# Patient Record
Sex: FEMALE | Race: WHITE | NOT HISPANIC OR LATINO | Employment: FULL TIME | URBAN - NONMETROPOLITAN AREA
[De-identification: names, ages, dates, MRNs, and addresses within clinical notes are randomized per-mention and may not be internally consistent; named-entity substitution may affect disease eponyms.]

---

## 2017-01-31 RX ORDER — RANITIDINE 150 MG/1
150 TABLET ORAL DAILY
COMMUNITY
End: 2017-04-14

## 2017-02-02 ENCOUNTER — OFFICE VISIT (OUTPATIENT)
Dept: GASTROENTEROLOGY | Facility: CLINIC | Age: 22
End: 2017-02-02

## 2017-02-02 VITALS
HEART RATE: 64 BPM | WEIGHT: 132 LBS | OXYGEN SATURATION: 99 % | HEIGHT: 63 IN | SYSTOLIC BLOOD PRESSURE: 130 MMHG | DIASTOLIC BLOOD PRESSURE: 70 MMHG | BODY MASS INDEX: 23.39 KG/M2

## 2017-02-02 DIAGNOSIS — R10.13 EPIGASTRIC PAIN: Primary | ICD-10-CM

## 2017-02-02 DIAGNOSIS — R11.0 NAUSEA: ICD-10-CM

## 2017-02-02 PROCEDURE — 99203 OFFICE O/P NEW LOW 30 MIN: CPT | Performed by: CLINICAL NURSE SPECIALIST

## 2017-02-02 NOTE — PROGRESS NOTES
Chief Complaint   Patient presents with   • GI Problem     New patient ref by Dr. NUVIA Quintana for nausea and vomiting     Subjective   HPI  Nelia Chavez is a 21 y.o. female who presents with a complaint of nausea or vomiting. She says that this began in November. She has epigastric abdominal pain that is a burn and cramp, mild to moderate comes and goes. No triggers no alleviating factors.  She had her gallbladder out in December and this did help somewhat. She was vomiting daily for a month prior to that. She continues to have persistent nausea less vomiting. No aggravating or alleviating factors.  No change in bowels. No BRBPR. No melena. She is more on the constipated side of things even after her gallbladder being removed.   She says that she cannot drink water bc it makes her sugar low. She drinks coke products and monster drinks, her mom says low sugar or coke zero. She is diabetic and is on insulin pump.  She will run anywhere from in the 300s to hypoglycemia in the 40s which she is today, She is working nights now and this has been difficult to manage her sugar. She has an appointment at [facility] next week for her diabetes. She says that her thyroid is being monitored there as well. She is taking Zantac 150 mg once per day.     She does drink ETOH weekly bloody Jena, margaritas, mixed drinks at least weekly not just on the weekends    Past Medical History   Diagnosis Date   • Diabetes mellitus      type 1. Pt is on insulin pump   • Dysmenorrhea    • Thyromegaly      Past Surgical History   Procedure Laterality Date   • No past surgeries       Outpatient Prescriptions Marked as Taking for the 2/2/17 encounter (Office Visit) with EDUIN Arana   Medication Sig Dispense Refill   • insulin aspart (novoLOG) 100 UNIT/ML injection Inject  under the skin 3 (Three) Times a Day Before Meals.     • Norethin-Eth Estrad-Fe Biphas (LO LOESTRIN FE) 1 MG-10 MCG / 10 MCG tablet Take  by mouth.     • raNITIdine  (ZANTAC) 150 MG tablet Take 150 mg by mouth Daily.       No Known Allergies  Social History     Social History   • Marital status: Single     Spouse name: N/A   • Number of children: N/A   • Years of education: N/A     Occupational History   • Not on file.     Social History Main Topics   • Smoking status: Never Smoker   • Smokeless tobacco: Never Used   • Alcohol use Yes      Comment: Socially   • Drug use: No   • Sexual activity: Defer     Other Topics Concern   • Not on file     Social History Narrative     Family History   Problem Relation Age of Onset   • Diabetes Maternal Grandmother    • Raynaud syndrome Maternal Grandmother    • Coronary artery disease Maternal Grandmother    • Diabetes Maternal Grandfather    • Coronary artery disease Maternal Grandfather    • Diabetes Paternal Grandmother    • Coronary artery disease Paternal Grandmother    • Coronary artery disease Paternal Grandfather    • Colon cancer Neg Hx    • Colon polyps Neg Hx      Health Maintenance   Topic Date Due   • TDAP/TD VACCINES (1 - Tdap) 04/07/2014   • INFLUENZA VACCINE  08/01/2016     Review of Systems   Constitutional: Negative for activity change, appetite change, chills, diaphoresis, fatigue, fever and unexpected weight change.   HENT: Negative for ear pain, hearing loss, mouth sores, sore throat, trouble swallowing and voice change.    Eyes: Negative.    Respiratory: Negative for cough, choking, shortness of breath and wheezing.    Cardiovascular: Negative for chest pain and palpitations.   Gastrointestinal: Positive for abdominal pain and constipation. Negative for blood in stool, diarrhea, nausea and vomiting.   Endocrine: Negative for cold intolerance and heat intolerance.   Genitourinary: Negative for decreased urine volume, dysuria, frequency, hematuria and urgency.   Musculoskeletal: Negative for back pain, gait problem and myalgias.   Skin: Negative for color change, pallor and rash.   Allergic/Immunologic: Negative for food  "allergies and immunocompromised state.   Neurological: Negative for dizziness, tremors, seizures, syncope, weakness, light-headedness, numbness and headaches.   Hematological: Negative for adenopathy. Does not bruise/bleed easily.   Psychiatric/Behavioral: Negative for agitation and confusion. The patient is not nervous/anxious.    All other systems reviewed and are negative.    Objective   Vitals:    02/02/17 1029   BP: 130/70   Pulse: 64   SpO2: 99%   Weight: 132 lb (59.9 kg)   Height: 63\" (160 cm)     Body mass index is 23.38 kg/(m^2).  Physical Exam   Constitutional: She is oriented to person, place, and time. She appears well-developed and well-nourished.   HENT:   Head: Normocephalic and atraumatic.   Eyes: Pupils are equal, round, and reactive to light.   Neck: Normal range of motion. Neck supple. No tracheal deviation present.   Cardiovascular: Normal rate, regular rhythm and normal heart sounds.  Exam reveals no gallop and no friction rub.    No murmur heard.  Pulmonary/Chest: Effort normal and breath sounds normal. No respiratory distress. She has no wheezes. She has no rales. She exhibits no tenderness.   Abdominal: Soft. Bowel sounds are normal. She exhibits no distension. There is no hepatosplenomegaly. There is no tenderness. There is no rigidity, no rebound and no guarding.   Musculoskeletal: Normal range of motion. She exhibits no edema, tenderness or deformity.   Neurological: She is alert and oriented to person, place, and time. She has normal reflexes.   Skin: Skin is warm and dry. No rash noted. No pallor.   Psychiatric: She has a normal mood and affect. Her behavior is normal. Judgment and thought content normal.     Assessment/Plan   Kenton was seen today for gi problem.    Diagnoses and all orders for this visit:    Epigastric pain    Nausea    LONG DISCUSSION TODAY WITH KENTON AND HER MOM WHOM IS HERE WITH HER TODAY. I THINK A CONTRIBUTING FACTOR FOR HER NAUSEA AND EPIGASTRIC PAIN IS HER " DIABETES AND INSTABILITY OF HER GLUCOSE. I ALSO DISCUSSED DIET WITH HER AND SUGGESTED THAT SHE DISCONTINUE HER CARBONATION AND MONSTER DRINKS FOR THESE ARE HARD ON HER STOMACH. WE DISCUSSED ETOH AS WELL AND I ADVISED HER TO NOT DRINK NOT ONLY FOR HER DIABETES BUT SECONDARY TO HER UPPER GI COMPLAINTS AS WELL. I WOULD LIKE TO SEE HOW SHE DOES WITH MONITORING HER SUGAR AND DIET MODIFICATIONS BEFORE CONSIDERING ENDOSCOPY. I RECOMMENDED SHE KEEP A WRITTEN LOG OF HER DIET AND SUGAR.  I SUGGESTED SHE INCREASE HER ZANTAC TO TWICE PER DAY AS WELL. ANOTHER CONSIDERATION IS HER DIABETES AND GASTROPARESIS. WILL SEE HER BACK IN 2 MONTHS AND WILL DETERMINE IF FURTHER WORKUP IS WARRANTED.     EMR Dragon/transcription disclaimer: Much of this encounter note is electronic transcription/translation of spoken language to printed text. The electronic translation of spoken language may be erroneous, or at times, nonsensical words or phrases may be inadvertently transcribed. Although I have reviewed the note for such errors, some may still exist.  Body mass index is 23.38 kg/(m^2).  Return in about 2 months (around 4/2/2017).

## 2017-03-30 DIAGNOSIS — Z30.41 ENCOUNTER FOR SURVEILLANCE OF CONTRACEPTIVE PILLS: Primary | ICD-10-CM

## 2017-04-04 ENCOUNTER — OFFICE VISIT (OUTPATIENT)
Dept: GASTROENTEROLOGY | Facility: CLINIC | Age: 22
End: 2017-04-04

## 2017-04-04 VITALS
HEART RATE: 106 BPM | WEIGHT: 135 LBS | OXYGEN SATURATION: 99 % | BODY MASS INDEX: 23.92 KG/M2 | DIASTOLIC BLOOD PRESSURE: 70 MMHG | SYSTOLIC BLOOD PRESSURE: 122 MMHG | HEIGHT: 63 IN

## 2017-04-04 DIAGNOSIS — K62.5 RECTAL BLEEDING: Primary | ICD-10-CM

## 2017-04-04 DIAGNOSIS — R10.10 PAIN OF UPPER ABDOMEN: ICD-10-CM

## 2017-04-04 PROCEDURE — 99214 OFFICE O/P EST MOD 30 MIN: CPT | Performed by: INTERNAL MEDICINE

## 2017-04-04 RX ORDER — SODIUM, POTASSIUM,MAG SULFATES 17.5-3.13G
2 SOLUTION, RECONSTITUTED, ORAL ORAL ONCE
Qty: 1 BOTTLE | Refills: 0 | Status: SHIPPED | OUTPATIENT
Start: 2017-04-04 | End: 2017-04-04

## 2017-04-04 NOTE — PROGRESS NOTES
[facility] Gastroenterology - Office note  4/4/2017    Nelia Chavez 1995     Chief Complaint   Patient presents with   • GI Problem     Abdominal pain and nausea & vomiting       HISTORY OF PRESENT ILLNESS    Nelia Chavez is a 21 y.o. female who presents for follow-up.  Having abdominal pain with some intermittent nausea but primarily complaining of constipation with abdominal pain.  She is a type I diabetic followed at [place].  She has an insulin pump in place.  She tells me that the constipation is her primary complaint.    Gallbladder removed back in December because it was nonfunctioning.  Symptoms have persisted.  She tells me she cannot drink water because it reciprocate a drop in her blood sugar.  Has tried MiraLAX but it caused increased abdominal cramping.  She is seeing some bright red blood noted with a hard stool.  There has been no weight loss.  There have been some concerns of diabetic gastroparesis although she expresses no other signs of diabetic neuropathy.    Past Medical History:   Diagnosis Date   • Diabetes mellitus     type 1. Pt is on insulin pump   • Dysmenorrhea    • Thyromegaly        Past Surgical History:   Procedure Laterality Date   • NO PAST SURGERIES           Current Outpatient Prescriptions:   •  aluminum hydroxide-mag carbonate (GAVISCON EXTRA RELIEF) 160-105 MG chewable tablet chewable tablet, Chew 4 (Four) Times a Day With Meals & at Bedtime., Disp: , Rfl:   •  insulin aspart (novoLOG) 100 UNIT/ML injection, Inject  under the skin 3 (Three) Times a Day Before Meals., Disp: , Rfl:   •  Norethin-Eth Estrad-Fe Biphas (LO LOESTRIN FE) 1 MG-10 MCG / 10 MCG tablet, Take 1 packet by mouth Daily for 28 days., Disp: 28 tablet, Rfl: 0  •  omeprazole (priLOSEC) 20 MG capsule, Take 20 mg by mouth Daily., Disp: , Rfl:   •  Probiotic Product (PROBIOTIC ADVANCED PO), Take  by mouth., Disp: , Rfl:   •  raNITIdine (ZANTAC) 150 MG tablet, Take 150 mg by mouth Daily., Disp: ,  "Rfl:   •  sodium-potassium-magnesium sulfates (SUPREP BOWEL PREP) solution oral solution, Take 2 bottles by mouth 1 (One) Time for 1 dose. As directed by office instructions provided., Disp: 1 bottle, Rfl: 0    No Known Allergies    Social History     Social History   • Marital status: Single     Spouse name: N/A   • Number of children: N/A   • Years of education: N/A     Occupational History   • Not on file.     Social History Main Topics   • Smoking status: Never Smoker   • Smokeless tobacco: Never Used   • Alcohol use Yes      Comment: liquor a few nights per week   • Drug use: No   • Sexual activity: Defer     Other Topics Concern   • Not on file     Social History Narrative       Family History   Problem Relation Age of Onset   • Diabetes Maternal Grandmother    • Raynaud syndrome Maternal Grandmother    • Coronary artery disease Maternal Grandmother    • Diabetes Maternal Grandfather    • Coronary artery disease Maternal Grandfather    • Diabetes Paternal Grandmother    • Coronary artery disease Paternal Grandmother    • Coronary artery disease Paternal Grandfather    • Colon cancer Neg Hx    • Colon polyps Neg Hx        Review of Systems   Constitutional: Negative.    HENT: Negative.    Respiratory: Negative.    Cardiovascular: Negative.    Gastrointestinal: Positive for abdominal pain, blood in stool, constipation and nausea.   Genitourinary: Negative.    Musculoskeletal: Negative.        Vitals:    04/04/17 1437   BP: 122/70   Pulse: 106   SpO2: 99%   Weight: 135 lb (61.2 kg)   Height: 63\" (160 cm)    Body mass index is 23.91 kg/(m^2).    Physical Exam   Constitutional: She is oriented to person, place, and time. She appears well-developed and well-nourished.   HENT:   Head: Normocephalic.   Cardiovascular: Normal rate.    Pulmonary/Chest: Effort normal.   Abdominal: Soft. Bowel sounds are normal.   Neurological: She is alert and oriented to person, place, and time.   Skin: Skin is warm and dry. "         ASSESSMENT AND PLAN      1. Rectal bleeding  Most likely related to her constipation.  We will proceed with colonoscopy to evaluate.  - Case request    2. Pain of upper abdomen  Proceed with endoscopy to evaluate for signs of reflux  - Case request     3.  Constipation  Have had a long discussion with Nelia and her mother.  I think part of her problem is she simply does not consume enough water.  I think some of her dietary issues as outlined in her previous note also play a significant role.  We will complete her endoscopy and colonoscopy to make further assessment of her constipation perhaps starting her on MiraLAX daily after she has completed the prep will allow her to gain control of her constipation without significant cramping.  Could also consider Linzess.  She needs to increase her fiber.  Further recommendations to follow    EMR Dragon/transcription disclaimer: Much of this encounter note is an electronic transcription/translation of spoken language to printed text.  The electronic translation of spoken language may permit erroneous, or at times, nonsensical words or phrases to be inadvertently transcribed.  Although I have reviewed the note for such errors, some may still exist.    Andrew Paredes MD  4/4/2017  3:49 PM

## 2017-04-07 ENCOUNTER — HOSPITAL ENCOUNTER (OUTPATIENT)
Facility: HOSPITAL | Age: 22
Setting detail: HOSPITAL OUTPATIENT SURGERY
Discharge: HOME | End: 2017-04-07
Attending: INTERNAL MEDICINE | Admitting: INTERNAL MEDICINE

## 2017-04-07 ENCOUNTER — ANESTHESIA (OUTPATIENT)
Dept: GASTROENTEROLOGY | Facility: HOSPITAL | Age: 22
End: 2017-04-07

## 2017-04-07 ENCOUNTER — ANESTHESIA EVENT (OUTPATIENT)
Dept: GASTROENTEROLOGY | Facility: HOSPITAL | Age: 22
End: 2017-04-07

## 2017-04-07 VITALS
TEMPERATURE: 98.6 F | WEIGHT: 139 LBS | HEART RATE: 79 BPM | OXYGEN SATURATION: 100 % | BODY MASS INDEX: 21.07 KG/M2 | RESPIRATION RATE: 13 BRPM | HEIGHT: 68 IN | SYSTOLIC BLOOD PRESSURE: 126 MMHG | DIASTOLIC BLOOD PRESSURE: 80 MMHG

## 2017-04-07 DIAGNOSIS — K62.5 RECTAL BLEEDING: ICD-10-CM

## 2017-04-07 LAB
GLUCOSE BLDC GLUCOMTR-MCNC: 225 MG/DL (ref 70–130)
HCG SERPL QL: NEGATIVE

## 2017-04-07 PROCEDURE — 84703 CHORIONIC GONADOTROPIN ASSAY: CPT | Performed by: NURSE ANESTHETIST, CERTIFIED REGISTERED

## 2017-04-07 PROCEDURE — 25010000002 PROPOFOL 10 MG/ML EMULSION: Performed by: NURSE ANESTHETIST, CERTIFIED REGISTERED

## 2017-04-07 PROCEDURE — 88305 TISSUE EXAM BY PATHOLOGIST: CPT | Performed by: INTERNAL MEDICINE

## 2017-04-07 PROCEDURE — 43239 EGD BIOPSY SINGLE/MULTIPLE: CPT | Performed by: INTERNAL MEDICINE

## 2017-04-07 PROCEDURE — 45380 COLONOSCOPY AND BIOPSY: CPT | Performed by: INTERNAL MEDICINE

## 2017-04-07 PROCEDURE — 82962 GLUCOSE BLOOD TEST: CPT

## 2017-04-07 RX ORDER — LIDOCAINE HYDROCHLORIDE 20 MG/ML
INJECTION, SOLUTION INFILTRATION; PERINEURAL AS NEEDED
Status: DISCONTINUED | OUTPATIENT
Start: 2017-04-07 | End: 2017-04-07 | Stop reason: SURG

## 2017-04-07 RX ORDER — SODIUM CHLORIDE 9 MG/ML
100 INJECTION, SOLUTION INTRAVENOUS CONTINUOUS
Status: DISCONTINUED | OUTPATIENT
Start: 2017-04-07 | End: 2017-04-07 | Stop reason: HOSPADM

## 2017-04-07 RX ORDER — PROPOFOL 10 MG/ML
VIAL (ML) INTRAVENOUS AS NEEDED
Status: DISCONTINUED | OUTPATIENT
Start: 2017-04-07 | End: 2017-04-07 | Stop reason: SURG

## 2017-04-07 RX ORDER — SODIUM CHLORIDE 0.9 % (FLUSH) 0.9 %
1-10 SYRINGE (ML) INJECTION AS NEEDED
Status: DISCONTINUED | OUTPATIENT
Start: 2017-04-07 | End: 2017-04-07 | Stop reason: HOSPADM

## 2017-04-07 RX ADMIN — PROPOFOL 200 MG: 10 INJECTION, EMULSION INTRAVENOUS at 08:18

## 2017-04-07 RX ADMIN — PROPOFOL 150 MG: 10 INJECTION, EMULSION INTRAVENOUS at 08:23

## 2017-04-07 RX ADMIN — PROPOFOL 50 MG: 10 INJECTION, EMULSION INTRAVENOUS at 08:37

## 2017-04-07 RX ADMIN — LIDOCAINE HYDROCHLORIDE 0.5 ML: 10 INJECTION, SOLUTION EPIDURAL; INFILTRATION; INTRACAUDAL; PERINEURAL at 07:19

## 2017-04-07 RX ADMIN — SODIUM CHLORIDE 100 ML/HR: 9 INJECTION, SOLUTION INTRAVENOUS at 07:18

## 2017-04-07 RX ADMIN — LIDOCAINE HYDROCHLORIDE 50 MG: 20 INJECTION, SOLUTION INFILTRATION; PERINEURAL at 08:18

## 2017-04-07 RX ADMIN — PROPOFOL 50 MG: 10 INJECTION, EMULSION INTRAVENOUS at 08:29

## 2017-04-07 NOTE — H&P (VIEW-ONLY)
[facility] Gastroenterology - Office note  4/4/2017    Nelia Chavez 1995     Chief Complaint   Patient presents with   • GI Problem     Abdominal pain and nausea & vomiting       HISTORY OF PRESENT ILLNESS    Nelia Chavez is a 21 y.o. female who presents for follow-up.  Having abdominal pain with some intermittent nausea but primarily complaining of constipation with abdominal pain.  She is a type I diabetic followed at [place].  She has an insulin pump in place.  She tells me that the constipation is her primary complaint.    Gallbladder removed back in December because it was nonfunctioning.  Symptoms have persisted.  She tells me she cannot drink water because it reciprocate a drop in her blood sugar.  Has tried MiraLAX but it caused increased abdominal cramping.  She is seeing some bright red blood noted with a hard stool.  There has been no weight loss.  There have been some concerns of diabetic gastroparesis although she expresses no other signs of diabetic neuropathy.    Past Medical History:   Diagnosis Date   • Diabetes mellitus     type 1. Pt is on insulin pump   • Dysmenorrhea    • Thyromegaly        Past Surgical History:   Procedure Laterality Date   • NO PAST SURGERIES           Current Outpatient Prescriptions:   •  aluminum hydroxide-mag carbonate (GAVISCON EXTRA RELIEF) 160-105 MG chewable tablet chewable tablet, Chew 4 (Four) Times a Day With Meals & at Bedtime., Disp: , Rfl:   •  insulin aspart (novoLOG) 100 UNIT/ML injection, Inject  under the skin 3 (Three) Times a Day Before Meals., Disp: , Rfl:   •  Norethin-Eth Estrad-Fe Biphas (LO LOESTRIN FE) 1 MG-10 MCG / 10 MCG tablet, Take 1 packet by mouth Daily for 28 days., Disp: 28 tablet, Rfl: 0  •  omeprazole (priLOSEC) 20 MG capsule, Take 20 mg by mouth Daily., Disp: , Rfl:   •  Probiotic Product (PROBIOTIC ADVANCED PO), Take  by mouth., Disp: , Rfl:   •  raNITIdine (ZANTAC) 150 MG tablet, Take 150 mg by mouth Daily., Disp: ,  "Rfl:   •  sodium-potassium-magnesium sulfates (SUPREP BOWEL PREP) solution oral solution, Take 2 bottles by mouth 1 (One) Time for 1 dose. As directed by office instructions provided., Disp: 1 bottle, Rfl: 0    No Known Allergies    Social History     Social History   • Marital status: Single     Spouse name: N/A   • Number of children: N/A   • Years of education: N/A     Occupational History   • Not on file.     Social History Main Topics   • Smoking status: Never Smoker   • Smokeless tobacco: Never Used   • Alcohol use Yes      Comment: liquor a few nights per week   • Drug use: No   • Sexual activity: Defer     Other Topics Concern   • Not on file     Social History Narrative       Family History   Problem Relation Age of Onset   • Diabetes Maternal Grandmother    • Raynaud syndrome Maternal Grandmother    • Coronary artery disease Maternal Grandmother    • Diabetes Maternal Grandfather    • Coronary artery disease Maternal Grandfather    • Diabetes Paternal Grandmother    • Coronary artery disease Paternal Grandmother    • Coronary artery disease Paternal Grandfather    • Colon cancer Neg Hx    • Colon polyps Neg Hx        Review of Systems   Constitutional: Negative.    HENT: Negative.    Respiratory: Negative.    Cardiovascular: Negative.    Gastrointestinal: Positive for abdominal pain, blood in stool, constipation and nausea.   Genitourinary: Negative.    Musculoskeletal: Negative.        Vitals:    04/04/17 1437   BP: 122/70   Pulse: 106   SpO2: 99%   Weight: 135 lb (61.2 kg)   Height: 63\" (160 cm)    Body mass index is 23.91 kg/(m^2).    Physical Exam   Constitutional: She is oriented to person, place, and time. She appears well-developed and well-nourished.   HENT:   Head: Normocephalic.   Cardiovascular: Normal rate.    Pulmonary/Chest: Effort normal.   Abdominal: Soft. Bowel sounds are normal.   Neurological: She is alert and oriented to person, place, and time.   Skin: Skin is warm and dry. "         ASSESSMENT AND PLAN      1. Rectal bleeding  Most likely related to her constipation.  We will proceed with colonoscopy to evaluate.  - Case request    2. Pain of upper abdomen  Proceed with endoscopy to evaluate for signs of reflux  - Case request     3.  Constipation  Have had a long discussion with Nelia and her mother.  I think part of her problem is she simply does not consume enough water.  I think some of her dietary issues as outlined in her previous note also play a significant role.  We will complete her endoscopy and colonoscopy to make further assessment of her constipation perhaps starting her on MiraLAX daily after she has completed the prep will allow her to gain control of her constipation without significant cramping.  Could also consider Linzess.  She needs to increase her fiber.  Further recommendations to follow    EMR Dragon/transcription disclaimer: Much of this encounter note is an electronic transcription/translation of spoken language to printed text.  The electronic translation of spoken language may permit erroneous, or at times, nonsensical words or phrases to be inadvertently transcribed.  Although I have reviewed the note for such errors, some may still exist.    Andrew Paredes MD  4/4/2017  3:49 PM

## 2017-04-07 NOTE — PLAN OF CARE
Problem: GI Endoscopy (Adult)  Goal: Signs and Symptoms of Listed Potential Problems Will be Absent or Manageable (GI Endoscopy)  Outcome: Outcome(s) achieved Date Met:  04/07/17 04/07/17 0857   GI Endoscopy   Problems Assessed (GI Endoscopy) all   Problems Present (GI Endoscopy) none

## 2017-04-07 NOTE — PLAN OF CARE
Problem: Patient Care Overview (Adult)  Goal: Plan of Care Review  Outcome: Outcome(s) achieved Date Met:  04/07/17 04/07/17 0858   Coping/Psychosocial Response Interventions   Plan Of Care Reviewed With patient;mother   Patient Care Overview   Progress improving   Outcome Evaluation   Outcome Summary/Follow up Plan DISCHARAGE CRITERIA MET

## 2017-04-07 NOTE — PLAN OF CARE
Problem: Patient Care Overview (Adult)  Goal: Plan of Care Review  Outcome: Ongoing (interventions implemented as appropriate)    04/07/17 0828   Coping/Psychosocial Response Interventions   Plan Of Care Reviewed With patient   Patient Care Overview   Progress no change   Outcome Evaluation   Outcome Summary/Follow up Plan tolerating well         Problem: GI Endoscopy (Adult)  Goal: Signs and Symptoms of Listed Potential Problems Will be Absent or Manageable (GI Endoscopy)  Outcome: Ongoing (interventions implemented as appropriate)

## 2017-04-07 NOTE — PLAN OF CARE
Problem: Patient Care Overview (Adult)  Goal: Adult Individualization and Mutuality  Outcome: Ongoing (interventions implemented as appropriate)    04/07/17 0639   Individualization   Patient Specific Preferences none   Patient Specific Goals none   Patient Specific Interventions none   Mutuality/Individual Preferences   What Anxieties, Fears or Concerns Do You Have About Your Health or Care? none   What Questions Do You Have About Your Health or Care? none   What Information Would Help Us Give You More Personalized Care? none

## 2017-04-07 NOTE — ANESTHESIA POSTPROCEDURE EVALUATION
Patient: Nelia Chavez    Procedure Summary     Date Anesthesia Start Anesthesia Stop Room / Location    04/07/17 0816 0841 [facility] ENDOSCOPY 2 / [facility]       Procedure Diagnosis Surgeon Provider    COLONOSCOPY WITH ANESTHESIA (N/A ); ESOPHAGOGASTRODUODENOSCOPY WITH ANESTHESIA (N/A Esophagus) Rectal bleeding  (Rectal bleeding [K62.5]) MD Jose Elias Conway CRNA          Anesthesia Type: general  Last vitals  BP      Temp      Pulse     Resp      SpO2        Post Anesthesia Care and Evaluation    Patient location during evaluation: PHASE II  Patient participation: complete - patient participated  Level of consciousness: awake and alert  Pain score: 0  Pain management: adequate  Airway patency: patent  Anesthetic complications: No anesthetic complications  PONV Status: none  Cardiovascular status: acceptable  Respiratory status: acceptable  Hydration status: acceptable

## 2017-04-07 NOTE — ANESTHESIA PREPROCEDURE EVALUATION
Anesthesia Evaluation     Patient summary reviewed and Nursing notes reviewed   no history of anesthetic complications:  NPO Status: > 8 hours   Airway   Mallampati: II  TM distance: >3 FB  Neck ROM: full  no difficulty expected  Dental - normal exam     Pulmonary - negative pulmonary ROS and normal exam   Cardiovascular - negative cardio ROS and normal exam  Exercise tolerance: excellent (>7 METS)        Neuro/Psych- negative ROS  GI/Hepatic/Renal/Endo    (+)  diabetes mellitus (insulin pump disconnected) type 1 well controlled using insulin,     Musculoskeletal (-) negative ROS    Abdominal    Substance History - negative use     OB/GYN          Other - negative ROS                                   Anesthesia Plan    ASA 2     general     intravenous induction   Anesthetic plan and risks discussed with patient and mother.

## 2017-04-10 LAB
CYTO UR: NORMAL
LAB AP CASE REPORT: NORMAL
LAB AP CLINICAL INFORMATION: NORMAL
Lab: NORMAL
PATH REPORT.FINAL DX SPEC: NORMAL
PATH REPORT.GROSS SPEC: NORMAL

## 2017-04-12 ENCOUNTER — HOSPITAL ENCOUNTER (OUTPATIENT)
Dept: NUCLEAR MEDICINE | Facility: HOSPITAL | Age: 22
Discharge: HOME | End: 2017-04-12
Attending: INTERNAL MEDICINE

## 2017-04-12 DIAGNOSIS — K62.5 RECTAL BLEEDING: ICD-10-CM

## 2017-04-12 PROCEDURE — 0 TECHNETIUM SULFUR COLLOID: Performed by: INTERNAL MEDICINE

## 2017-04-12 PROCEDURE — 78264 GASTRIC EMPTYING IMG STUDY: CPT

## 2017-04-12 PROCEDURE — A9541 TC99M SULFUR COLLOID: HCPCS | Performed by: INTERNAL MEDICINE

## 2017-04-12 RX ADMIN — Medication 1 DOSE: at 07:10

## 2017-04-14 ENCOUNTER — TELEPHONE (OUTPATIENT)
Dept: GASTROENTEROLOGY | Facility: CLINIC | Age: 22
End: 2017-04-14

## 2017-04-14 ENCOUNTER — OFFICE VISIT (OUTPATIENT)
Dept: OBSTETRICS AND GYNECOLOGY | Facility: CLINIC | Age: 22
End: 2017-04-14

## 2017-04-14 VITALS
DIASTOLIC BLOOD PRESSURE: 72 MMHG | SYSTOLIC BLOOD PRESSURE: 114 MMHG | BODY MASS INDEX: 19.7 KG/M2 | HEIGHT: 68 IN | WEIGHT: 130 LBS

## 2017-04-14 DIAGNOSIS — Z01.419 WELL WOMAN EXAM WITH ROUTINE GYNECOLOGICAL EXAM: Primary | ICD-10-CM

## 2017-04-14 DIAGNOSIS — Z30.41 ENCOUNTER FOR SURVEILLANCE OF CONTRACEPTIVE PILLS: ICD-10-CM

## 2017-04-14 DIAGNOSIS — E10.9 TYPE 1 DIABETES MELLITUS WITHOUT COMPLICATION (HCC): ICD-10-CM

## 2017-04-14 PROCEDURE — 99395 PREV VISIT EST AGE 18-39: CPT | Performed by: NURSE PRACTITIONER

## 2017-04-14 PROCEDURE — G0123 SCREEN CERV/VAG THIN LAYER: HCPCS | Performed by: NURSE PRACTITIONER

## 2017-04-14 NOTE — PROGRESS NOTES
Mary Chavez is a 22 y.o. female.    Gynecologic Exam   The patient's pertinent negatives include no pelvic pain, vaginal bleeding or vaginal discharge. The patient is experiencing no pain. Associated symptoms include constipation and nausea. Pertinent negatives include no abdominal pain, anorexia, back pain, chills, diarrhea, discolored urine, dysuria, fever, flank pain, frequency, headaches, hematuria, joint pain, joint swelling, painful intercourse, rash, sore throat, urgency or vomiting. She is sexually active. She uses oral contraceptives for contraception. Her menstrual history has been regular.       The following portions of the patient's history were reviewed and updated as appropriate: allergies, current medications, past family history, past medical history, past social history, past surgical history and problem list.    Review of Systems   Constitutional: Negative for activity change, appetite change, chills, diaphoresis, fatigue, fever and unexpected weight change.   HENT: Negative for congestion, ear discharge, ear pain, facial swelling, hearing loss, mouth sores, nosebleeds, postnasal drip, rhinorrhea, sinus pressure, sneezing, sore throat, tinnitus, trouble swallowing and voice change.    Eyes: Negative for photophobia, pain, discharge, redness, itching and visual disturbance.   Respiratory: Negative for apnea, cough, choking, chest tightness and shortness of breath.    Cardiovascular: Negative for chest pain, palpitations and leg swelling.   Gastrointestinal: Positive for constipation and nausea. Negative for abdominal distention, abdominal pain, anal bleeding, anorexia, blood in stool, diarrhea, rectal pain and vomiting.   Endocrine: Negative for cold intolerance and heat intolerance.   Genitourinary: Negative for decreased urine volume, difficulty urinating, dyspareunia, dysuria, flank pain, frequency, genital sores, hematuria, menstrual problem, pelvic pain, urgency, vaginal  bleeding, vaginal discharge and vaginal pain.   Musculoskeletal: Negative for arthralgias, back pain, joint pain, joint swelling and myalgias.   Skin: Negative for color change and rash.   Allergic/Immunologic: Negative for environmental allergies.   Neurological: Negative for dizziness, syncope, weakness, numbness and headaches.   Hematological: Negative for adenopathy.   Psychiatric/Behavioral: Negative for agitation, confusion and sleep disturbance. The patient is not nervous/anxious.        Objective   Physical Exam   Constitutional: She is oriented to person, place, and time. She appears well-developed and well-nourished. No distress.   HENT:   Head: Normocephalic.   Right Ear: External ear normal.   Left Ear: External ear normal.   Nose: Nose normal.   Mouth/Throat: Oropharynx is clear and moist.   Eyes: Conjunctivae are normal. Right eye exhibits no discharge. Left eye exhibits no discharge.   Neck: Normal range of motion. Neck supple. Carotid bruit is not present. No tracheal deviation present. No thyromegaly present.   Cardiovascular: Normal rate, regular rhythm, normal heart sounds and intact distal pulses.    No murmur heard.  Pulmonary/Chest: Effort normal and breath sounds normal. No respiratory distress. She has no wheezes. Right breast exhibits no inverted nipple, no mass, no nipple discharge, no skin change and no tenderness. Left breast exhibits no inverted nipple, no mass, no nipple discharge, no skin change and no tenderness. Breasts are symmetrical. There is no breast swelling.   Abdominal: Soft. She exhibits no distension and no mass. There is no tenderness. There is no guarding. No hernia. Hernia confirmed negative in the right inguinal area and confirmed negative in the left inguinal area.   Genitourinary: Vagina normal and uterus normal. Rectal exam shows no external hemorrhoid, no internal hemorrhoid, no fissure, no mass, no tenderness and anal tone normal. No breast tenderness, discharge or  bleeding. Pelvic exam was performed with patient supine. There is no rash, tenderness, lesion or injury on the right labia. There is no rash, tenderness, lesion or injury on the left labia. Uterus is not enlarged, not fixed and not tender. Cervix exhibits no motion tenderness, no discharge and no friability. Right adnexum displays no mass, no tenderness and no fullness. Left adnexum displays no mass, no tenderness and no fullness. No bleeding in the vagina. No vaginal discharge found.   Genitourinary Comments:   BSU normal  Urethral meatus  Normal  Perineum  Normal   Musculoskeletal: Normal range of motion. She exhibits no edema or tenderness.   Lymphadenopathy:        Head (right side): No submental, no submandibular, no tonsillar, no preauricular, no posterior auricular and no occipital adenopathy present.        Head (left side): No submental, no submandibular, no tonsillar, no preauricular, no posterior auricular and no occipital adenopathy present.     She has no cervical adenopathy.        Right cervical: No superficial cervical, no deep cervical and no posterior cervical adenopathy present.       Left cervical: No superficial cervical, no deep cervical and no posterior cervical adenopathy present.     She has no axillary adenopathy.        Right: No inguinal adenopathy present.        Left: No inguinal adenopathy present.   Neurological: She is alert and oriented to person, place, and time. Coordination normal.   Skin: Skin is warm and dry. No bruising and no rash noted. She is not diaphoretic. No erythema.   Psychiatric: She has a normal mood and affect. Her behavior is normal. Judgment and thought content normal.   Nursing note and vitals reviewed.        Assessment/Plan   Nelia was seen today for gynecologic exam.    Diagnoses and all orders for this visit:    Well woman exam with routine gynecological exam  Comments:  Normal GYN exam. Encouraged to do SBE. Has lab at PCP.  Orders:  -     Liquid-based  Pap Smear, Screening    Encounter for surveillance of contraceptive pills  Comments:  Doing well with OC's.  Orders:  -     Norethin-Eth Estrad-Fe Biphas (LO LOESTRIN FE) 1 MG-10 MCG / 10 MCG tablet; Take 1 packet by mouth Daily for 28 days.    Type 1 diabetes mellitus without complication  Comments:  Followed by Prudencio. DX at age 7.

## 2017-04-17 ENCOUNTER — TELEPHONE (OUTPATIENT)
Dept: GASTROENTEROLOGY | Facility: CLINIC | Age: 22
End: 2017-04-17

## 2017-04-18 ENCOUNTER — OFFICE VISIT (OUTPATIENT)
Dept: GASTROENTEROLOGY | Facility: CLINIC | Age: 22
End: 2017-04-18

## 2017-04-18 VITALS
WEIGHT: 134 LBS | OXYGEN SATURATION: 99 % | DIASTOLIC BLOOD PRESSURE: 60 MMHG | BODY MASS INDEX: 23.74 KG/M2 | SYSTOLIC BLOOD PRESSURE: 116 MMHG | HEIGHT: 63 IN | HEART RATE: 84 BPM

## 2017-04-18 DIAGNOSIS — K59.00 CONSTIPATION, UNSPECIFIED CONSTIPATION TYPE: ICD-10-CM

## 2017-04-18 DIAGNOSIS — R10.10 PAIN OF UPPER ABDOMEN: Primary | ICD-10-CM

## 2017-04-18 LAB
GEN CATEG CVX/VAG CYTO-IMP: NORMAL
LAB AP CASE REPORT: NORMAL
LAB AP GYN OTHER FINDINGS: NORMAL
Lab: NORMAL
PATH INTERP SPEC-IMP: NORMAL
STAT OF ADQ CVX/VAG CYTO-IMP: NORMAL

## 2017-04-18 PROCEDURE — 99213 OFFICE O/P EST LOW 20 MIN: CPT | Performed by: INTERNAL MEDICINE

## 2017-04-18 NOTE — PROGRESS NOTES
[facility] Gastroenterology - Office note  4/18/2017    Nelia Chavez 1995     Chief Complaint   Patient presents with   • GI Problem     Here to discuss Gastric Emptying Study       HISTORY OF PRESENT ILLNESS    Nelia Chavez is a 22 y.o. female who presents for follow-up.  Abdominal pain and constipation.  We did her endoscopy and colonoscopy.  Her Endo was essentially normal.  Small bowel biopsies were negative for celiac disease.  Colonoscopy was also normal.  She had one small area of inflammation which was biopsied and was also felt to be benign mucosa.  We did a gastric emptying study which revealed significant gastroparesis.  96% of the material remained within her stomach at 120 minutes.  Her constipation had actually improved.  She has increased her water intake.    Past Medical History:   Diagnosis Date   • Diabetes mellitus     type 1. Pt is on insulin pump   • Dysmenorrhea    • Thyromegaly        Past Surgical History:   Procedure Laterality Date   • CHOLECYSTECTOMY     • COLONOSCOPY N/A 4/7/2017    Procedure: COLONOSCOPY WITH ANESTHESIA;  Surgeon: Andrew Paredes MD;  Location: [facility] ENDOSCOPY;  Service:    • ENDOSCOPY N/A 4/7/2017    Procedure: ESOPHAGOGASTRODUODENOSCOPY WITH ANESTHESIA;  Surgeon: Andrew Paredes MD;  Location: [facility] ENDOSCOPY;  Service:    • NO PAST SURGERIES           Current Outpatient Prescriptions:   •  aluminum hydroxide-mag carbonate (GAVISCON EXTRA RELIEF) 160-105 MG chewable tablet chewable tablet, Chew 4 (Four) Times a Day With Meals & at Bedtime., Disp: , Rfl:   •  insulin aspart (novoLOG) 100 UNIT/ML injection, Inject  under the skin 3 (Three) Times a Day Before Meals., Disp: , Rfl:   •  Norethin-Eth Estrad-Fe Biphas (LO LOESTRIN FE) 1 MG-10 MCG / 10 MCG tablet, Take 1 packet by mouth Daily for 28 days., Disp: 28 tablet, Rfl: 12    No Known Allergies    Social History     Social History   • Marital status: Single     Spouse name: N/A   • Number of children:  "N/A   • Years of education: N/A     Occupational History   • Not on file.     Social History Main Topics   • Smoking status: Never Smoker   • Smokeless tobacco: Never Used   • Alcohol use Yes      Comment: liquor a few nights per week   • Drug use: No   • Sexual activity: Yes     Birth control/ protection: OCP     Other Topics Concern   • Not on file     Social History Narrative       Family History   Problem Relation Age of Onset   • Diabetes Maternal Grandmother    • Raynaud syndrome Maternal Grandmother    • Coronary artery disease Maternal Grandmother    • Diabetes Maternal Grandfather    • Coronary artery disease Maternal Grandfather    • Diabetes Paternal Grandmother    • Coronary artery disease Paternal Grandmother    • Coronary artery disease Paternal Grandfather    • Colon cancer Neg Hx    • Colon polyps Neg Hx    • Breast cancer Neg Hx    • Ovarian cancer Neg Hx    • Uterine cancer Neg Hx    • Melanoma Neg Hx    • Prostate cancer Neg Hx        Review of Systems    Vitals:    04/18/17 1532   BP: 116/60   Pulse: 84   SpO2: 99%   Weight: 134 lb (60.8 kg)   Height: 63\" (160 cm)    Body mass index is 23.74 kg/(m^2).    Physical Exam      ASSESSMENT AND PLAN      1. Pain of upper abdomen  Most likely gastroparesis related to her diabetes.  I have given a copy of her report to her mother to take to their physicians at [facility] and perhaps her diabetes physicians can get her to someone that deals with gastroparesis.  I do not wish to begin Reglan at this time.  I will be happy to see her back at any time if I can assist with her care    2. Constipation, unspecified constipation type  She tells me is better since she increased her water    EMR Dragon/transcription disclaimer: Much of this encounter note is an electronic transcription/translation of spoken language to printed text.  The electronic translation of spoken language may permit erroneous, or at times, nonsensical words or phrases to be inadvertently " transcribed.  Although I have reviewed the note for such errors, some may still exist.    Andrew Paredes MD  4/18/2017  4:22 PM

## 2018-04-02 DIAGNOSIS — Z30.41 ENCOUNTER FOR SURVEILLANCE OF CONTRACEPTIVE PILLS: ICD-10-CM

## 2018-05-01 ENCOUNTER — OFFICE VISIT (OUTPATIENT)
Dept: GASTROENTEROLOGY | Facility: CLINIC | Age: 23
End: 2018-05-01

## 2018-05-01 VITALS
BODY MASS INDEX: 24.98 KG/M2 | OXYGEN SATURATION: 99 % | WEIGHT: 141 LBS | DIASTOLIC BLOOD PRESSURE: 70 MMHG | SYSTOLIC BLOOD PRESSURE: 106 MMHG | HEIGHT: 63 IN | HEART RATE: 96 BPM

## 2018-05-01 DIAGNOSIS — K60.2 ANAL FISSURE: Primary | ICD-10-CM

## 2018-05-01 PROCEDURE — 99212 OFFICE O/P EST SF 10 MIN: CPT | Performed by: INTERNAL MEDICINE

## 2018-05-01 NOTE — PROGRESS NOTES
[facility] Gastroenterology - Office note  5/1/2018    Nelia Chavez 1995     Chief Complaint   Patient presents with   • Rectal Bleeding     Patient is here today with complaints of 2 tears around her rectum. She states that they have since healed but she had to quit her job because of it.       HISTORY OF PRESENT ILLNESS    Nelia Chavez is a 23 y.o. female who presentsFor evaluation rectal pain.  She has what sound like a small rectal fissure.  Was seen in Dr. Quintana's office and they gave her an appointment which has helped.  She describes the pain happening after having hard stool.  She did see a little bit of blood.  Reviewed her recent colonoscopy without any evidence for inflammatory bowel disease.    Past Medical History:   Diagnosis Date   • Diabetes mellitus     type 1. Pt is on insulin pump   • Dysmenorrhea    • Thyromegaly        Past Surgical History:   Procedure Laterality Date   • CHOLECYSTECTOMY     • COLONOSCOPY N/A 4/7/2017    Localized mild active inflammation was found in the rectum   • ENDOSCOPY N/A 4/7/2017    Normal exam   • NO PAST SURGERIES           Current Outpatient Prescriptions:   •  aluminum hydroxide-mag carbonate (GAVISCON EXTRA RELIEF) 160-105 MG chewable tablet chewable tablet, Chew 4 (Four) Times a Day With Meals & at Bedtime., Disp: , Rfl:   •  insulin aspart (novoLOG) 100 UNIT/ML injection, Inject  under the skin 3 (Three) Times a Day Before Meals., Disp: , Rfl:   •  Norethin-Eth Estrad-Fe Biphas (LO LOESTRIN FE) 1 MG-10 MCG / 10 MCG tablet, Take 1 tablet by mouth Daily., Disp: 28 tablet, Rfl: 1    No Known Allergies    Social History     Social History   • Marital status: Single     Spouse name: N/A   • Number of children: N/A   • Years of education: N/A     Occupational History   • Not on file.     Social History Main Topics   • Smoking status: Never Smoker   • Smokeless tobacco: Never Used   • Alcohol use Yes      Comment: liquor a few nights per week   •  "Drug use: No   • Sexual activity: Yes     Birth control/ protection: OCP     Other Topics Concern   • Not on file     Social History Narrative   • No narrative on file       Family History   Problem Relation Age of Onset   • Diabetes Maternal Grandmother    • Raynaud syndrome Maternal Grandmother    • Coronary artery disease Maternal Grandmother    • Diabetes Maternal Grandfather    • Coronary artery disease Maternal Grandfather    • Diabetes Paternal Grandmother    • Coronary artery disease Paternal Grandmother    • Coronary artery disease Paternal Grandfather    • Colon cancer Neg Hx    • Colon polyps Neg Hx    • Breast cancer Neg Hx    • Ovarian cancer Neg Hx    • Uterine cancer Neg Hx    • Melanoma Neg Hx    • Prostate cancer Neg Hx        Review of Systems    Vitals:    05/01/18 1502   BP: 106/70   Pulse: 96   SpO2: 99%   Weight: 64 kg (141 lb)   Height: 160 cm (63\")    Body mass index is 24.98 kg/m².    Physical Exam   Abdominal: Soft. She exhibits no distension. There is no tenderness.             ASSESSMENT AND PLAN      1. Anal fissure  Resolved at this time.  I suggested she begin taking Citrucel twice daily drinking more water.  I will see her back in 3 months.  She will call if she has any other specific problems       EMR Dragon/transcription disclaimer: Much of this encounter note is an electronic transcription/translation of spoken language to printed text.  The electronic translation of spoken language may permit erroneous, or at times, nonsensical words or phrases to be inadvertently transcribed.  Although I have reviewed the note for such errors, some may still exist.    Andrew Paredes MD  5/1/2018  4:08 PM  "

## 2018-05-11 ENCOUNTER — TELEPHONE (OUTPATIENT)
Dept: OBSTETRICS AND GYNECOLOGY | Facility: CLINIC | Age: 23
End: 2018-05-11

## 2018-05-11 DIAGNOSIS — Z30.41 ENCOUNTER FOR SURVEILLANCE OF CONTRACEPTIVE PILLS: ICD-10-CM

## 2018-06-13 ENCOUNTER — OFFICE VISIT (OUTPATIENT)
Dept: OBSTETRICS AND GYNECOLOGY | Facility: CLINIC | Age: 23
End: 2018-06-13

## 2018-06-13 VITALS
WEIGHT: 134 LBS | DIASTOLIC BLOOD PRESSURE: 68 MMHG | SYSTOLIC BLOOD PRESSURE: 124 MMHG | BODY MASS INDEX: 23.74 KG/M2 | HEIGHT: 63 IN

## 2018-06-13 DIAGNOSIS — Z80.3 FAMILY HISTORY OF BREAST CANCER: ICD-10-CM

## 2018-06-13 DIAGNOSIS — Z30.41 ENCOUNTER FOR SURVEILLANCE OF CONTRACEPTIVE PILLS: ICD-10-CM

## 2018-06-13 DIAGNOSIS — N93.8 DUB (DYSFUNCTIONAL UTERINE BLEEDING): ICD-10-CM

## 2018-06-13 DIAGNOSIS — E10.9 TYPE 1 DIABETES MELLITUS WITHOUT COMPLICATION (HCC): ICD-10-CM

## 2018-06-13 DIAGNOSIS — Z01.419 WELL WOMAN EXAM WITH ROUTINE GYNECOLOGICAL EXAM: Primary | ICD-10-CM

## 2018-06-13 PROCEDURE — G0123 SCREEN CERV/VAG THIN LAYER: HCPCS | Performed by: NURSE PRACTITIONER

## 2018-06-13 PROCEDURE — 99213 OFFICE O/P EST LOW 20 MIN: CPT | Performed by: NURSE PRACTITIONER

## 2018-06-13 PROCEDURE — 99395 PREV VISIT EST AGE 18-39: CPT | Performed by: NURSE PRACTITIONER

## 2018-06-13 RX ORDER — NORETHINDRONE ACETATE AND ETHINYL ESTRADIOL, AND FERROUS FUMARATE 1MG-20(24)
1 KIT ORAL DAILY
Qty: 28 CAPSULE | Refills: 12 | Status: SHIPPED | OUTPATIENT
Start: 2018-06-13 | End: 2019-06-17

## 2018-06-13 RX ORDER — FLASH GLUCOSE SENSOR
KIT MISCELLANEOUS
Refills: 11 | COMMUNITY
Start: 2018-05-18 | End: 2019-09-30

## 2018-06-13 NOTE — PATIENT INSTRUCTIONS

## 2018-06-13 NOTE — PROGRESS NOTES
Attempted to obtain health maintenance information, patient unable to provide answers for the following items Mammogram, Colonoscopy, Pneumococcal Vaccine, Diabetic Eye Exam, Diabetic Foot Exam, HPV Vaccine, Chlamydia Screening  and Zoster Vaccine.

## 2018-06-13 NOTE — PROGRESS NOTES
"Subjective     Nelia Chavez is a 23 y.o. female    Pt is here for yearly check up. Has C/O DUB on her last 2 months of pills.       Gynecologic Exam   The patient's pertinent negatives include no pelvic pain, vaginal bleeding or vaginal discharge. The patient is experiencing no pain. Pertinent negatives include no abdominal pain, anorexia, back pain, chills, constipation, diarrhea, discolored urine, dysuria, fever, flank pain, frequency, headaches, hematuria, joint pain, joint swelling, nausea, painful intercourse, rash, sore throat, urgency or vomiting. Nothing aggravates the symptoms. She is sexually active. She uses oral contraceptives for contraception. Her menstrual history has been regular.   Menstrual Problem   This is a new problem. The current episode started more than 1 month ago. The problem occurs intermittently. The problem has been waxing and waning. Pertinent negatives include no abdominal pain, anorexia, arthralgias, change in bowel habit, chest pain, chills, congestion, coughing, diaphoresis, fatigue, fever, headaches, joint swelling, myalgias, nausea, neck pain, numbness, rash, sore throat, swollen glands, urinary symptoms, vertigo, visual change, vomiting or weakness. Nothing aggravates the symptoms. She has tried nothing for the symptoms.         /68   Ht 160 cm (63\")   Wt 60.8 kg (134 lb)   LMP 06/04/2018 (Approximate) Comment: bcp  BMI 23.74 kg/m²     Outpatient Encounter Prescriptions as of 6/13/2018   Medication Sig Dispense Refill   • aluminum hydroxide-mag carbonate (GAVISCON EXTRA RELIEF) 160-105 MG chewable tablet chewable tablet Chew 4 (Four) Times a Day With Meals & at Bedtime.     • Continuous Blood Gluc Sensor (FREESTYLE ELINOR SENSOR SYSTEM) [facility] [place] USE ONE SENSOR EVERY 10 DAYS  11   • insulin aspart (novoLOG) 100 UNIT/ML injection Inject  under the skin 3 (Three) Times a Day Before Meals.     • [DISCONTINUED] Norethin-Eth Estrad-Fe Biphas (LO LOESTRIN FE) 1 MG-10 MCG / " 10 MCG tablet Take 1 tablet by mouth Daily. 28 tablet 1   • Norethin Ace-Eth Estrad-FE 1-20 MG-MCG(24) capsule Take 1 tablet by mouth Daily. 28 capsule 12     No facility-administered encounter medications on file as of 6/13/2018.        Surgical History  Past Surgical History:   Procedure Laterality Date   • CHOLECYSTECTOMY     • COLONOSCOPY N/A 4/7/2017    Localized mild active inflammation was found in the rectum   • ENDOSCOPY N/A 4/7/2017    Normal exam   • NO PAST SURGERIES         Family History  Family History   Problem Relation Age of Onset   • Diabetes Maternal Grandmother    • Raynaud syndrome Maternal Grandmother    • Coronary artery disease Maternal Grandmother    • Diabetes Maternal Grandfather    • Coronary artery disease Maternal Grandfather    • Diabetes Paternal Grandmother    • Coronary artery disease Paternal Grandmother    • Breast cancer Paternal Grandmother 75   • Coronary artery disease Paternal Grandfather    • Colon cancer Neg Hx    • Colon polyps Neg Hx    • Ovarian cancer Neg Hx    • Uterine cancer Neg Hx    • Melanoma Neg Hx    • Prostate cancer Neg Hx        The following portions of the patient's history were reviewed and updated as appropriate: allergies, current medications, past family history, past medical history, past social history, past surgical history and problem list.    Review of Systems   Constitutional: Negative for activity change, appetite change, chills, diaphoresis, fatigue, fever, unexpected weight gain and unexpected weight loss.   HENT: Negative for congestion, dental problem, drooling, ear discharge, ear pain, facial swelling, hearing loss, mouth sores, nosebleeds, postnasal drip, rhinorrhea, sinus pressure, sneezing, sore throat, tinnitus, trouble swallowing and voice change.    Eyes: Negative for blurred vision, double vision, photophobia, pain, discharge, redness, itching and visual disturbance.   Respiratory: Negative for apnea, cough, choking, chest tightness,  shortness of breath, wheezing and stridor.    Cardiovascular: Negative for chest pain, palpitations and leg swelling.   Gastrointestinal: Negative for abdominal distention, abdominal pain, anal bleeding, anorexia, blood in stool, change in bowel habit, constipation, diarrhea, nausea, rectal pain, vomiting, GERD and indigestion.   Endocrine: Negative for cold intolerance, heat intolerance, polydipsia, polyphagia, polyuria and breast discharge.   Genitourinary: Positive for menstrual problem. Negative for urinary incontinence, decreased libido, decreased urine volume, difficulty urinating, dyspareunia, dysuria, flank pain, frequency, genital sores, hematuria, pelvic pain, urgency, vaginal bleeding, vaginal discharge, vaginal pain, breast lump and breast pain.   Musculoskeletal: Negative for arthralgias, back pain, gait problem, joint pain, joint swelling, myalgias, neck pain, neck stiffness and bursitis.   Skin: Negative for color change, dry skin, pallor, rash, skin lesions and bruise.   Allergic/Immunologic: Negative for environmental allergies, food allergies and immunocompromised state.   Neurological: Negative for dizziness, vertigo, tremors, seizures, syncope, facial asymmetry, speech difficulty, weakness, light-headedness, numbness, headache, memory problem and confusion.   Hematological: Negative for adenopathy. Does not bruise/bleed easily.   Psychiatric/Behavioral: Negative for agitation, behavioral problems, decreased concentration, dysphoric mood, hallucinations, self-injury, sleep disturbance, suicidal ideas, negative for hyperactivity, depressed mood and stress. The patient is not nervous/anxious.        Objective   Physical Exam   Constitutional: She is oriented to person, place, and time. She appears well-developed and well-nourished. No distress.   HENT:   Head: Normocephalic.   Right Ear: External ear normal.   Left Ear: External ear normal.   Nose: Nose normal.   Mouth/Throat: Oropharynx is clear  and moist.   Eyes: Conjunctivae are normal. Right eye exhibits no discharge. Left eye exhibits no discharge. No scleral icterus.   Neck: Normal range of motion. Neck supple. Carotid bruit is not present. No tracheal deviation present. No thyromegaly present.   Cardiovascular: Normal rate, regular rhythm, normal heart sounds and intact distal pulses.    No murmur heard.  Pulmonary/Chest: Effort normal and breath sounds normal. No respiratory distress. She has no wheezes. Right breast exhibits no inverted nipple, no mass, no nipple discharge, no skin change and no tenderness. Left breast exhibits no inverted nipple, no mass, no nipple discharge, no skin change and no tenderness. Breasts are symmetrical. There is no breast swelling.   Abdominal: Soft. She exhibits no distension and no mass. There is no tenderness. There is no guarding. No hernia. Hernia confirmed negative in the right inguinal area and confirmed negative in the left inguinal area.   Genitourinary: Rectum normal, vagina normal and uterus normal. Rectal exam shows no mass. No breast tenderness, discharge or bleeding. Pelvic exam was performed with patient supine. There is no rash, tenderness, lesion or injury on the right labia. There is no rash, tenderness, lesion or injury on the left labia. Uterus is not enlarged, not fixed and not tender. Cervix exhibits no motion tenderness, no discharge and no friability. Right adnexum displays no mass, no tenderness and no fullness. Left adnexum displays no mass, no tenderness and no fullness. No erythema, tenderness or bleeding in the vagina. No foreign body in the vagina. No signs of injury around the vagina. No vaginal discharge found.   Genitourinary Comments:   BSU normal  Urethral meatus  Normal  Perineum  Normal   Musculoskeletal: Normal range of motion. She exhibits no edema or tenderness.   Lymphadenopathy:        Head (right side): No submental, no submandibular, no tonsillar, no preauricular, no  posterior auricular and no occipital adenopathy present.        Head (left side): No submental, no submandibular, no tonsillar, no preauricular, no posterior auricular and no occipital adenopathy present.     She has no cervical adenopathy.        Right cervical: No superficial cervical, no deep cervical and no posterior cervical adenopathy present.       Left cervical: No superficial cervical, no deep cervical and no posterior cervical adenopathy present.     She has no axillary adenopathy.        Right: No inguinal adenopathy present.        Left: No inguinal adenopathy present.   Neurological: She is alert and oriented to person, place, and time. Coordination normal.   Skin: Skin is warm and dry. No bruising and no rash noted. She is not diaphoretic. No erythema.   Psychiatric: She has a normal mood and affect. Her behavior is normal. Judgment and thought content normal.   Nursing note and vitals reviewed.      Assessment/Plan   Nelia was seen today for gynecologic exam.    Diagnoses and all orders for this visit:    Well woman exam with routine gynecological exam  Normal GYN exam. Will have lab work at PCP. Encouraged SBE, pt is aware how to do self breast exam and the importance of same. Discussed weight management and importance of maintaining a healthy weight. Discussed Vitamin D intake and the importance of adequate vitamin D for both Bone Health and a healthy immune system.  Discussed Daily exercise and the importance of same, in regards to a healthy heart as well as helping to maintain her weight and improving her mental health.  BMI 23.7. Colonoscopy is up to date. Pap smear is done per [facility]P guidelines.  -     Liquid-based Pap Smear, Screening    DUB (dysfunctional uterine bleeding)  Comments:  Had 2 episodes of BTB with her pills. Will switch to something else.  Orders:  -     Norethin Ace-Eth Estrad-FE 1-20 MG-MCG(24) capsule; Take 1 tablet by mouth Daily.    Encounter for surveillance of  contraceptive pills  Comments:  Pt has had BTB on LoLoestrin. Will switch to Taytulla.     Type 1 diabetes mellitus without complication  Comments:  Pt is followed at [facility]. DX at age 7.    Family history of breast cancer  Pt has a family history of Breast Cancer. We discussed [facility] Genetic screening that can be done to see if she carries the Gene for Breast, Ovarian, Colon, Melanoma, Uterine and Pancreatic Cancers. We discussed if positive she will have free Genetic counseling through [facility]. We also discussed if she does have the positive gene she can have more testing yearly and even surgery if desired. Pt states her PGM has been tested and was negative.          Patient's Body mass index is 23.74 kg/m². BMI is within normal parameters. No follow-up required.      Anusha Sarkar, APRN  6/13/2018

## 2018-06-18 LAB
GEN CATEG CVX/VAG CYTO-IMP: NORMAL
LAB AP CASE REPORT: NORMAL
LAB AP GYN ADDITIONAL INFORMATION: NORMAL
LAB AP GYN OTHER FINDINGS: NORMAL
Lab: NORMAL
PATH INTERP SPEC-IMP: NORMAL
STAT OF ADQ CVX/VAG CYTO-IMP: NORMAL

## 2018-06-19 ENCOUNTER — TELEPHONE (OUTPATIENT)
Dept: OBSTETRICS AND GYNECOLOGY | Facility: CLINIC | Age: 23
End: 2018-06-19

## 2018-07-31 ENCOUNTER — OFFICE VISIT (OUTPATIENT)
Dept: GASTROENTEROLOGY | Facility: CLINIC | Age: 23
End: 2018-07-31

## 2018-07-31 VITALS
HEIGHT: 63 IN | HEART RATE: 88 BPM | SYSTOLIC BLOOD PRESSURE: 122 MMHG | DIASTOLIC BLOOD PRESSURE: 80 MMHG | BODY MASS INDEX: 23.57 KG/M2 | OXYGEN SATURATION: 99 % | WEIGHT: 133 LBS

## 2018-07-31 DIAGNOSIS — K60.2 ANAL FISSURE: Primary | ICD-10-CM

## 2018-07-31 PROCEDURE — 99213 OFFICE O/P EST LOW 20 MIN: CPT | Performed by: INTERNAL MEDICINE

## 2018-07-31 NOTE — PROGRESS NOTES
[facility] - Office note  7/31/2018    Nelia Chavez 1995     Chief Complaint   Patient presents with   • GI Problem     Diarrhea       HISTORY OF PRESENT ILLNESS    Nelia Chavez is a 23 y.o. female who presents for follow-up.  Had what sounded like a rectal fissure when I saw her last time.  Her symptoms had improved.  Now she is having problems which she describes as diarrhea although she is not really having loose stool.  She stopped taking the fiber.  Drinks very little water.     Past Medical History:   Diagnosis Date   • Diabetes mellitus (CMS/HCC)     type 1. Pt is on insulin pump   • Dysmenorrhea    • Thyromegaly        Past Surgical History:   Procedure Laterality Date   • CHOLECYSTECTOMY     • COLONOSCOPY N/A 4/7/2017    Localized mild active inflammation was found in the rectum   • ENDOSCOPY N/A 4/7/2017    Normal exam   • NO PAST SURGERIES           Current Outpatient Prescriptions:   •  aluminum hydroxide-mag carbonate (GAVISCON EXTRA RELIEF) 160-105 MG chewable tablet chewable tablet, Chew 4 (Four) Times a Day With Meals & at Bedtime., Disp: , Rfl:   •  Continuous Blood Gluc Sensor (FREESTYLE ELINOR SENSOR SYSTEM) misc, USE ONE SENSOR EVERY 10 DAYS, Disp: , Rfl: 11  •  insulin aspart (novoLOG) 100 UNIT/ML injection, Inject  under the skin 3 (Three) Times a Day Before Meals., Disp: , Rfl:   •  Norethin Ace-Eth Estrad-FE 1-20 MG-MCG(24) capsule, Take 1 tablet by mouth Daily., Disp: 28 capsule, Rfl: 12    No Known Allergies    Social History     Social History   • Marital status: Single     Spouse name: N/A   • Number of children: N/A   • Years of education: N/A     Occupational History   • Not on file.     Social History Main Topics   • Smoking status: Never Smoker   • Smokeless tobacco: Never Used   • Alcohol use Yes      Comment: liquor a few nights per week   • Drug use: No   • Sexual activity: Yes     Birth control/ protection: OCP     Other Topics Concern   • Not  "on file     Social History Narrative   • No narrative on file       Family History   Problem Relation Age of Onset   • Diabetes Maternal Grandmother    • Raynaud syndrome Maternal Grandmother    • Coronary artery disease Maternal Grandmother    • Diabetes Maternal Grandfather    • Coronary artery disease Maternal Grandfather    • Diabetes Paternal Grandmother    • Coronary artery disease Paternal Grandmother    • Breast cancer Paternal Grandmother 75   • Coronary artery disease Paternal Grandfather    • Colon cancer Neg Hx    • Colon polyps Neg Hx    • Ovarian cancer Neg Hx    • Uterine cancer Neg Hx    • Melanoma Neg Hx    • Prostate cancer Neg Hx        Review of Systems    Vitals:    07/31/18 1411   BP: 122/80   Pulse: 88   SpO2: 99%   Weight: 60.3 kg (133 lb)   Height: 160 cm (63\")    Body mass index is 23.56 kg/m².    Physical Exam        ASSESSMENT AND PLAN      1. Anal fissure  Talk with her a bit about getting back on the fiber.  I wonder to stay with the [employer] and make sure she drinks plenty of water.  I will be happy to see her back at any time.       EMR Dragon/transcription disclaimer: Much of this encounter note is an electronic transcription/translation of spoken language to printed text.  The electronic translation of spoken language may permit erroneous, or at times, nonsensical words or phrases to be inadvertently transcribed.  Although I have reviewed the note for such errors, some may still exist.    Andrew Paredes MD  7/31/2018  2:20 PM  "

## 2019-06-17 ENCOUNTER — OFFICE VISIT (OUTPATIENT)
Dept: OBSTETRICS AND GYNECOLOGY | Facility: CLINIC | Age: 24
End: 2019-06-17

## 2019-06-17 VITALS
BODY MASS INDEX: 24.63 KG/M2 | HEIGHT: 63 IN | WEIGHT: 139 LBS | SYSTOLIC BLOOD PRESSURE: 106 MMHG | DIASTOLIC BLOOD PRESSURE: 64 MMHG

## 2019-06-17 DIAGNOSIS — Z80.3 FAMILY HISTORY OF BREAST CANCER: ICD-10-CM

## 2019-06-17 DIAGNOSIS — Z01.419 WELL WOMAN EXAM WITH ROUTINE GYNECOLOGICAL EXAM: Primary | ICD-10-CM

## 2019-06-17 DIAGNOSIS — Z30.41 ENCOUNTER FOR SURVEILLANCE OF CONTRACEPTIVE PILLS: ICD-10-CM

## 2019-06-17 PROCEDURE — 99395 PREV VISIT EST AGE 18-39: CPT | Performed by: NURSE PRACTITIONER

## 2019-06-17 PROCEDURE — G0123 SCREEN CERV/VAG THIN LAYER: HCPCS | Performed by: NURSE PRACTITIONER

## 2019-06-17 RX ORDER — NORETHINDRONE ACETATE AND ETHINYL ESTRADIOL, AND FERROUS FUMARATE 1MG-20(24)
1 KIT ORAL DAILY
Qty: 28 CAPSULE | Refills: 12 | Status: SHIPPED | OUTPATIENT
Start: 2019-06-17 | End: 2019-07-26

## 2019-06-17 RX ORDER — NORETHINDRONE ACETATE AND ETHINYL ESTRADIOL .03; 1.5 MG/1; MG/1
1 TABLET ORAL
COMMUNITY
End: 2019-06-17

## 2019-06-17 RX ORDER — NORETHINDRONE ACETATE AND ETHINYL ESTRADIOL, AND FERROUS FUMARATE 1MG-20(24)
KIT ORAL
COMMUNITY
End: 2019-06-17 | Stop reason: SDUPTHER

## 2019-06-17 RX ORDER — INSULIN PUMP CONTROLLER
EACH MISCELLANEOUS
COMMUNITY
Start: 2019-03-18 | End: 2023-02-17

## 2019-06-17 NOTE — PROGRESS NOTES
"Mary Chavez is a 24 y.o. female    Patient is here for yearly checkup, she has no complaints.      Gynecologic Exam   The patient's pertinent negatives include no pelvic pain, vaginal bleeding or vaginal discharge. Pertinent negatives include no abdominal pain, anorexia, back pain, chills, constipation, diarrhea, discolored urine, dysuria, fever, flank pain, frequency, headaches, hematuria, joint pain, joint swelling, nausea, painful intercourse, rash, sore throat, urgency or vomiting. She is sexually active. She uses oral contraceptives for contraception. Her menstrual history has been regular.         /64   Ht 160 cm (63\")   Wt 63 kg (139 lb)   LMP 01/17/2019 (Approximate) Comment: bcp  Breastfeeding? No   BMI 24.62 kg/m²     Outpatient Encounter Medications as of 6/17/2019   Medication Sig Dispense Refill   • aluminum hydroxide-mag carbonate (GAVISCON EXTRA RELIEF) 160-105 MG chewable tablet chewable tablet Chew 4 (Four) Times a Day With Meals & at Bedtime.     • Continuous Blood Gluc Sensor (No.1 TravellerYLE ELINOR SENSOR SYSTEM) mis USE ONE SENSOR EVERY 10 DAYS  11   • insulin aspart (novoLOG) 100 UNIT/ML injection Inject  under the skin 3 (Three) Times a Day Before Meals.     • Insulin Disposable Pump (OMNIPOD DASH 5 PACK) misc      • Norethin Ace-Eth Estrad-FE (TAYTULLA) 1-20 MG-MCG(24) capsule Take 1 tablet by mouth Daily. 28 capsule 12   • [DISCONTINUED] Norethin Ace-Eth Estrad-FE (TAYTULLA) 1-20 MG-MCG(24) capsule Take  by mouth.     • [DISCONTINUED] Norethin Ace-Eth Estrad-FE 1-20 MG-MCG(24) capsule Take 1 tablet by mouth Daily. 28 capsule 12   • [DISCONTINUED] Norethindrone Acet-Ethinyl Est (LOESTRIN 1.5/30, 21,) 1.5-30 MG-MCG tablet Take 1 tablet by mouth.     • influenza vac split quad (FLUZONE QUADRIVALENT) 0.5 ML suspension prefilled syringe injection TO BE ADMINISTERED BY PHARMACIST FOR IMMUNIZATION       No facility-administered encounter medications on file as of " 6/17/2019.        Surgical History  Past Surgical History:   Procedure Laterality Date   • CHOLECYSTECTOMY     • COLONOSCOPY N/A 4/7/2017    Localized mild active inflammation was found in the rectum   • ENDOSCOPY N/A 4/7/2017    Normal exam   • NO PAST SURGERIES         Family History  Family History   Problem Relation Age of Onset   • Diabetes Maternal Grandmother    • Raynaud syndrome Maternal Grandmother    • Coronary artery disease Maternal Grandmother    • Diabetes Maternal Grandfather    • Coronary artery disease Maternal Grandfather    • Diabetes Paternal Grandmother    • Coronary artery disease Paternal Grandmother    • Breast cancer Paternal Grandmother 75   • Coronary artery disease Paternal Grandfather    • No Known Problems Father    • No Known Problems Mother    • No Known Problems Brother    • Colon cancer Neg Hx    • Colon polyps Neg Hx    • Ovarian cancer Neg Hx    • Uterine cancer Neg Hx    • Melanoma Neg Hx    • Prostate cancer Neg Hx        The following portions of the patient's history were reviewed and updated as appropriate: allergies, current medications, past family history, past medical history, past social history, past surgical history and problem list.    Review of Systems   Constitutional: Negative for activity change, appetite change, chills, diaphoresis, fatigue, fever, unexpected weight gain and unexpected weight loss.   HENT: Negative for congestion, dental problem, drooling, ear discharge, ear pain, facial swelling, hearing loss, mouth sores, nosebleeds, postnasal drip, rhinorrhea, sinus pressure, sneezing, sore throat, tinnitus, trouble swallowing and voice change.    Eyes: Negative for blurred vision, double vision, photophobia, pain, discharge, redness, itching and visual disturbance.   Respiratory: Negative for apnea, cough, choking, chest tightness, shortness of breath, wheezing and stridor.    Cardiovascular: Negative for chest pain, palpitations and leg swelling.    Gastrointestinal: Negative for abdominal distention, abdominal pain, anal bleeding, anorexia, blood in stool, constipation, diarrhea, nausea, rectal pain, vomiting, GERD and indigestion.   Endocrine: Negative for cold intolerance, heat intolerance, polydipsia, polyphagia and polyuria.   Genitourinary: Negative for breast discharge, urinary incontinence, decreased libido, decreased urine volume, difficulty urinating, dyspareunia, dysuria, flank pain, frequency, genital sores, hematuria, menstrual problem, pelvic pain, urgency, vaginal bleeding, vaginal discharge, vaginal pain, breast lump and breast pain.   Musculoskeletal: Negative for arthralgias, back pain, gait problem, joint pain, joint swelling, myalgias, neck pain, neck stiffness and bursitis.   Skin: Negative for color change, dry skin and rash.   Allergic/Immunologic: Negative for environmental allergies, food allergies and immunocompromised state.   Neurological: Negative for dizziness, tremors, seizures, syncope, facial asymmetry, speech difficulty, weakness, light-headedness, numbness, headache, memory problem and confusion.   Hematological: Negative for adenopathy. Does not bruise/bleed easily.   Psychiatric/Behavioral: Negative for agitation, behavioral problems, decreased concentration, dysphoric mood, hallucinations, self-injury, sleep disturbance, suicidal ideas, negative for hyperactivity, depressed mood and stress. The patient is not nervous/anxious.        Objective   Physical Exam   Constitutional: She is oriented to person, place, and time. She appears well-developed and well-nourished. No distress.   HENT:   Head: Normocephalic.   Right Ear: External ear normal.   Left Ear: External ear normal.   Nose: Nose normal.   Mouth/Throat: Oropharynx is clear and moist.   Eyes: Conjunctivae are normal. Right eye exhibits no discharge. Left eye exhibits no discharge. No scleral icterus.   Neck: Normal range of motion. Neck supple. Carotid bruit is not  present. No tracheal deviation present. No thyromegaly present.   Cardiovascular: Normal rate, regular rhythm, normal heart sounds and intact distal pulses.   No murmur heard.  Pulmonary/Chest: Effort normal and breath sounds normal. No respiratory distress. She has no wheezes. Right breast exhibits no inverted nipple, no mass, no nipple discharge, no skin change and no tenderness. Left breast exhibits no inverted nipple, no mass, no nipple discharge, no skin change and no tenderness. Breasts are symmetrical. There is no breast swelling.   Abdominal: Soft. She exhibits no distension and no mass. There is no tenderness. There is no guarding. No hernia. Hernia confirmed negative in the right inguinal area and confirmed negative in the left inguinal area.   Genitourinary: Rectum normal, vagina normal and uterus normal. Rectal exam shows no mass. No breast tenderness, discharge or bleeding. Pelvic exam was performed with patient supine. There is no rash, tenderness, lesion or injury on the right labia. There is no rash, tenderness, lesion or injury on the left labia. Uterus is not enlarged, not fixed and not tender. Cervix exhibits no motion tenderness, no discharge and no friability. Right adnexum displays no mass, no tenderness and no fullness. Left adnexum displays no mass, no tenderness and no fullness. No erythema, tenderness or bleeding in the vagina. No foreign body in the vagina. No signs of injury around the vagina. No vaginal discharge found.   Genitourinary Comments:   BSU normal  Urethral meatus  Normal  Perineum  Normal   Musculoskeletal: Normal range of motion. She exhibits no edema or tenderness.   Lymphadenopathy:        Head (right side): No submental, no submandibular, no tonsillar, no preauricular, no posterior auricular and no occipital adenopathy present.        Head (left side): No submental, no submandibular, no tonsillar, no preauricular, no posterior auricular and no occipital adenopathy  present.     She has no cervical adenopathy.        Right cervical: No superficial cervical, no deep cervical and no posterior cervical adenopathy present.       Left cervical: No superficial cervical, no deep cervical and no posterior cervical adenopathy present.     She has no axillary adenopathy.        Right: No inguinal adenopathy present.        Left: No inguinal adenopathy present.   Neurological: She is alert and oriented to person, place, and time. Coordination normal.   Skin: Skin is warm and dry. No bruising and no rash noted. She is not diaphoretic. No erythema.   Psychiatric: She has a normal mood and affect. Her behavior is normal. Judgment and thought content normal.   Nursing note and vitals reviewed.      Assessment/Plan   Nelia was seen today for gynecologic exam.    Diagnoses and all orders for this visit:    Well woman exam with routine gynecological exam  Normal GYN exam. Will have lab work at PCP. Encouraged SBE, pt is aware how to do self breast exam and the importance of same. Discussed weight management and importance of maintaining a healthy weight. Discussed Vitamin D intake and the importance of adequate vitamin D for both Bone Health and a healthy immune system.  Discussed Daily exercise and the importance of same, in regards to a healthy heart as well as helping to maintain her weight and improving her mental health.  BMI 24.6. Pap smear is done per ASCCP guidelines.  Patient declines STD screening or Gardasil.  She is again given a pamphlet on Gardasil today.  -     Liquid-based Pap Smear, Screening    Encounter for surveillance of contraceptive pills  Comments:  Patient is doing well on Taytulla and wishes to continue same.  Refill is sent into her pharmacy.  Orders:  -     Norethin Ace-Eth Estrad-FE (TAYTULLA) 1-20 MG-MCG(24) capsule; Take 1 tablet by mouth Daily.    Family history of breast cancer  Pt has a family history of breast cancer. We discussed Genetic screening that can  be done to see if she carries the Gene for Breast, Ovarian, Colon, Melanoma, Uterine and Pancreatic Cancers. We discussed if positive she will have free Genetic counseling through [facility]. We also discussed if she does have the positive gene she can have more testing yearly, and even surgery if desired.     Type 1 diabetes mellitus without complication  Patient is followed at [facility].  She was diagnosed at age 7.  Her last A1c was 6.7.         Patient's Body mass index is 24.62 kg/m². BMI is within normal parameters. No follow-up required..      Anusha Sarkar, APRN  6/17/2019

## 2019-06-17 NOTE — PROGRESS NOTES
Attempted to obtain health maintenance information, patient unable to provide answers for the following items Mammogram, Colonoscopy, Medicare Annual Wellness Exam, Diabetic Eye Exam, Diabetic Foot Exam, HPV Vaccine, Chlamydia Screening  and Zoster Vaccine.

## 2019-06-17 NOTE — PATIENT INSTRUCTIONS

## 2019-06-18 ENCOUNTER — TELEPHONE (OUTPATIENT)
Dept: OBSTETRICS AND GYNECOLOGY | Facility: CLINIC | Age: 24
End: 2019-06-18

## 2019-06-18 LAB
GEN CATEG CVX/VAG CYTO-IMP: NORMAL
LAB AP CASE REPORT: NORMAL
LAB AP GYN ADDITIONAL INFORMATION: NORMAL
LAB AP GYN OTHER FINDINGS: NORMAL
PATH INTERP SPEC-IMP: NORMAL
STAT OF ADQ CVX/VAG CYTO-IMP: NORMAL

## 2019-07-25 ENCOUNTER — TELEPHONE (OUTPATIENT)
Dept: OBSTETRICS AND GYNECOLOGY | Facility: CLINIC | Age: 24
End: 2019-07-25

## 2019-07-25 NOTE — TELEPHONE ENCOUNTER
Patient's mother calls today stating her insurance is no longer wanting to cover Taytulla (brand name) due to it being substituted with a generic on 07/22/19. Patient's pharmacy will be sending over a PA for this.

## 2019-07-26 ENCOUNTER — TELEPHONE (OUTPATIENT)
Dept: OBSTETRICS AND GYNECOLOGY | Facility: CLINIC | Age: 24
End: 2019-07-26

## 2019-07-26 RX ORDER — NORETHINDRONE ACETATE AND ETHINYL ESTRADIOL 1; .02 MG/1; MG/1
1 TABLET ORAL DAILY
Qty: 28 TABLET | Refills: 12 | Status: SHIPPED | OUTPATIENT
Start: 2019-07-26 | End: 2019-09-30

## 2019-07-26 NOTE — TELEPHONE ENCOUNTER
taytulla is no longer a covered formulary medication is there a medical reason the patient needs to  Be on brand name?      formualry alternatives listed are   JUNEL FE 1/20  GIANVI TAB 3-0.02MG  NORETH/ETHIN TAB 1/20  DIXON TAB   DROSPIRE/ETH TAB

## 2019-09-30 ENCOUNTER — OFFICE VISIT (OUTPATIENT)
Dept: OBSTETRICS AND GYNECOLOGY | Facility: CLINIC | Age: 24
End: 2019-09-30

## 2019-09-30 VITALS
WEIGHT: 135 LBS | BODY MASS INDEX: 23.92 KG/M2 | SYSTOLIC BLOOD PRESSURE: 110 MMHG | HEIGHT: 63 IN | DIASTOLIC BLOOD PRESSURE: 68 MMHG

## 2019-09-30 DIAGNOSIS — Z30.41 ENCOUNTER FOR SURVEILLANCE OF CONTRACEPTIVE PILLS: Primary | ICD-10-CM

## 2019-09-30 DIAGNOSIS — R11.2 NAUSEA AND VOMITING, INTRACTABILITY OF VOMITING NOT SPECIFIED, UNSPECIFIED VOMITING TYPE: ICD-10-CM

## 2019-09-30 PROCEDURE — 99213 OFFICE O/P EST LOW 20 MIN: CPT | Performed by: NURSE PRACTITIONER

## 2019-09-30 RX ORDER — ONDANSETRON HYDROCHLORIDE 8 MG/1
8 TABLET, FILM COATED ORAL EVERY 8 HOURS PRN
Qty: 30 TABLET | Refills: 3 | Status: SHIPPED | OUTPATIENT
Start: 2019-09-30 | End: 2021-04-27

## 2019-09-30 RX ORDER — IBUPROFEN 600 MG/1
TABLET ORAL
Refills: 11 | COMMUNITY
Start: 2019-09-17 | End: 2021-04-27

## 2019-09-30 RX ORDER — PROCHLORPERAZINE 25 MG/1
SUPPOSITORY RECTAL
COMMUNITY
Start: 2019-09-06 | End: 2021-04-27

## 2019-09-30 RX ORDER — BLOOD-GLUCOSE METER
EACH MISCELLANEOUS SEE ADMIN INSTRUCTIONS
Refills: 0 | COMMUNITY
Start: 2019-09-16

## 2019-09-30 RX ORDER — NORETHINDRONE ACETATE AND ETHINYL ESTRADIOL, AND FERROUS FUMARATE 1MG-20(24)
1 KIT ORAL DAILY
Qty: 30 CAPSULE | Refills: 12 | Status: SHIPPED | OUTPATIENT
Start: 2019-09-30 | End: 2019-12-03

## 2019-09-30 RX ORDER — LEVOTHYROXINE SODIUM 25 MCG
TABLET ORAL
COMMUNITY
Start: 2019-09-18 | End: 2021-04-27

## 2019-09-30 NOTE — PATIENT INSTRUCTIONS

## 2019-09-30 NOTE — PROGRESS NOTES
"Subjective     Nelia Chavez is a 24 y.o. female    Patient is here today for follow-up of birth control pills.  She was on Taytulla and was doing fine her insurance decided they would no longer pay for it.  We switched her to Junel per their recommendation and she has had nausea and vomiting ever since she started the pill.  Her mother has talked with insurance company and they have agreed to pay for the Taytulla.      Contraception   This is a recurrent problem. The current episode started more than 1 month ago. The problem occurs daily. Associated symptoms include nausea. Pertinent negatives include no abdominal pain, anorexia, arthralgias, change in bowel habit, chest pain, chills, congestion, coughing, diaphoresis, fatigue, fever, headaches, joint swelling, myalgias, neck pain, numbness, rash, sore throat, swollen glands, urinary symptoms, vertigo, visual change, vomiting or weakness. Nothing aggravates the symptoms. Treatments tried: OCs. The treatment provided mild relief.         /68   Ht 160 cm (63\")   Wt 61.2 kg (135 lb)   LMP 09/29/2019 (Exact Date)   BMI 23.91 kg/m²     Outpatient Encounter Medications as of 9/30/2019   Medication Sig Dispense Refill   • aluminum hydroxide-mag carbonate (GAVISCON EXTRA RELIEF) 160-105 MG chewable tablet chewable tablet Chew 4 (Four) Times a Day With Meals & at Bedtime.     • Continuous Blood Gluc Transmit (DEXCOM G6 TRANSMITTER) misc Need one every 3 months, 90-day supply, NDC [address]-04     • GLUCAGON NA To be injected as directed in severe hypoglycemia event.     • glucose blood test strip      • insulin aspart (novoLOG) 100 UNIT/ML injection Inject  under the skin 3 (Three) Times a Day Before Meals.     • Insulin Disposable Pump (OMNIPOD DASH 5 PACK) misc      • [DISCONTINUED] norethindrone-ethinyl estradiol (JUNEL 1/20) 1-20 MG-MCG per tablet Take 1 tablet by mouth Daily. 28 tablet 12   • Blood Glucose Monitoring Suppl (ACCU-CHEK ALEXANDRE PLUS) " w/Device kit See Admin Instructions.  0   • GLUCAGON EMERGENCY 1 MG injection TO BE INJECTED AS DIRECTED IN SEVERE HYPOGLYCEMIA EVENT.  11   • influenza vac split quad (FLUZONE QUADRIVALENT) 0.5 ML suspension prefilled syringe injection TO BE ADMINISTERED BY PHARMACIST FOR IMMUNIZATION     • Norethin Ace-Eth Estrad-FE (TAYTULLA) 1-20 MG-MCG(24) capsule Take 1 tablet by mouth Daily. 30 capsule 12   • ondansetron (ZOFRAN) 8 MG tablet Take 1 tablet by mouth Every 8 (Eight) Hours As Needed for Nausea or Vomiting. 30 tablet 3   • SYNTHROID 25 MCG tablet      • [DISCONTINUED] Continuous Blood Gluc Sensor (FREESTYLE ELINOR SENSOR SYSTEM) [facility] [place] ONE SENSOR EVERY 10 DAYS  11     No facility-administered encounter medications on file as of 9/30/2019.        Surgical History  Past Surgical History:   Procedure Laterality Date   • CHOLECYSTECTOMY     • COLONOSCOPY N/A 4/7/2017    Localized mild active inflammation was found in the rectum   • ENDOSCOPY N/A 4/7/2017    Normal exam   • WISDOM TOOTH EXTRACTION         Family History  Family History   Problem Relation Age of Onset   • Diabetes Maternal Grandmother    • Raynaud syndrome Maternal Grandmother    • Coronary artery disease Maternal Grandmother    • Diabetes Maternal Grandfather    • Coronary artery disease Maternal Grandfather    • Diabetes Paternal Grandmother    • Coronary artery disease Paternal Grandmother    • Breast cancer Paternal Grandmother 75   • Coronary artery disease Paternal Grandfather    • No Known Problems Father    • No Known Problems Mother    • No Known Problems Brother    • Colon cancer Neg Hx    • Colon polyps Neg Hx    • Ovarian cancer Neg Hx    • Uterine cancer Neg Hx    • Melanoma Neg Hx    • Prostate cancer Neg Hx        The following portions of the patient's history were reviewed and updated as appropriate: allergies, current medications, past family history, past medical history, past social history, past surgical history and problem  list.    Review of Systems   Constitutional: Negative for activity change, appetite change, chills, diaphoresis, fatigue, fever, unexpected weight gain and unexpected weight loss.   HENT: Negative for congestion, dental problem, drooling, ear discharge, ear pain, facial swelling, hearing loss, mouth sores, nosebleeds, postnasal drip, rhinorrhea, sinus pressure, sneezing, sore throat, swollen glands, tinnitus, trouble swallowing and voice change.    Eyes: Negative for blurred vision, double vision, photophobia, pain, discharge, redness, itching and visual disturbance.   Respiratory: Negative for apnea, cough, choking, chest tightness, shortness of breath, wheezing and stridor.    Cardiovascular: Negative for chest pain, palpitations and leg swelling.   Gastrointestinal: Positive for nausea. Negative for abdominal distention, abdominal pain, anal bleeding, anorexia, blood in stool, change in bowel habit, constipation, diarrhea, rectal pain, vomiting, GERD and indigestion.   Endocrine: Negative for cold intolerance, heat intolerance, polydipsia, polyphagia and polyuria.   Genitourinary: Negative for amenorrhea, breast discharge, breast lump, breast pain, decreased libido, decreased urine volume, difficulty urinating, dyspareunia, dysuria, flank pain, frequency, genital sores, hematuria, menstrual problem, pelvic pain, pelvic pressure, urgency, urinary incontinence, vaginal bleeding, vaginal discharge and vaginal pain.   Musculoskeletal: Negative for arthralgias, back pain, gait problem, joint swelling, myalgias, neck pain, neck stiffness and bursitis.   Skin: Negative for color change, dry skin and rash.   Allergic/Immunologic: Negative for environmental allergies, food allergies and immunocompromised state.   Neurological: Negative for dizziness, vertigo, tremors, seizures, syncope, facial asymmetry, speech difficulty, weakness, light-headedness, numbness, headache, memory problem and confusion.   Hematological:  Negative for adenopathy. Does not bruise/bleed easily.   Psychiatric/Behavioral: Negative for agitation, behavioral problems, decreased concentration, dysphoric mood, hallucinations, self-injury, sleep disturbance, suicidal ideas, negative for hyperactivity, depressed mood and stress. The patient is not nervous/anxious.        Objective   Physical Exam   Constitutional: She is oriented to person, place, and time. She appears well-developed and well-nourished.   HENT:   Head: Normocephalic and atraumatic.   Eyes: Conjunctivae are normal. Right eye exhibits no discharge. Left eye exhibits no discharge.   Neck: Normal range of motion. Neck supple. No thyromegaly present.   Cardiovascular: Normal rate, regular rhythm and normal heart sounds.   Pulmonary/Chest: Effort normal and breath sounds normal.   Neurological: She is alert and oriented to person, place, and time.   Skin: Skin is warm and dry.   Psychiatric: She has a normal mood and affect. Her behavior is normal. Judgment and thought content normal.   Nursing note and vitals reviewed.      Assessment/Plan   Nelia was seen today for contraception.    Diagnoses and all orders for this visit:    Encounter for surveillance of contraceptive pills  Comments:  Patient had been on Taytulla and was doing fine.  And her insurance decided they would did not want to pay for it and wanted her to switch to Junel.  Patient's mother has been talking with insurance company and they are agreeable to pay for her Taytulla they are going to send us a form and she was given a prescription today.  Orders:  -     Norethin Ace-Eth Estrad-FE (TAYTULLA) 1-20 MG-MCG(24) capsule; Take 1 tablet by mouth Daily.    Nausea and vomiting, intractability of vomiting not specified, unspecified vomiting type  Comments:  Patient is given Zofran to use as needed for the nausea.  Orders:  -     ondansetron (ZOFRAN) 8 MG tablet; Take 1 tablet by mouth Every 8 (Eight) Hours As Needed for Nausea or  Vomiting.         Patient's Body mass index is 23.91 kg/m². BMI is within normal parameters. No follow-up required..      Anusha Sarkar, APRN  9/30/2019

## 2019-10-22 ENCOUNTER — PRIOR AUTHORIZATION (OUTPATIENT)
Dept: OBSTETRICS AND GYNECOLOGY | Facility: CLINIC | Age: 24
End: 2019-10-22

## 2019-12-03 ENCOUNTER — TELEPHONE (OUTPATIENT)
Dept: OBSTETRICS AND GYNECOLOGY | Facility: CLINIC | Age: 24
End: 2019-12-03

## 2019-12-03 ENCOUNTER — PRIOR AUTHORIZATION (OUTPATIENT)
Dept: OBSTETRICS AND GYNECOLOGY | Facility: CLINIC | Age: 24
End: 2019-12-03

## 2019-12-03 RX ORDER — DROSPIRENONE AND ETHINYL ESTRADIOL 0.02-3(28)
1 KIT ORAL DAILY
Qty: 28 TABLET | Refills: 12 | Status: SHIPPED | OUTPATIENT
Start: 2019-12-03 | End: 2020-01-15

## 2019-12-03 NOTE — TELEPHONE ENCOUNTER
Spoke with mother went back over the prior auth process and what information I had allready sent it and what we could do from here.  I will start an appeal     Mother would like something else called in until we get an answer about the appeal   Here is a list of the OCP that are covered      GIANVI TAB 3-0.02MG  NORETH/ETHIN TAB 1/20  DIXON TAB   DROSPIRE/ETH TAB

## 2019-12-03 NOTE — TELEPHONE ENCOUNTER
Received notification from Norma that Taytulla was denied.  Notified pt and her mother.  Mother is going to call insurance regarding denial and will be back in touch with us.

## 2020-01-15 ENCOUNTER — TELEPHONE (OUTPATIENT)
Dept: OBSTETRICS AND GYNECOLOGY | Facility: CLINIC | Age: 25
End: 2020-01-15

## 2020-01-15 RX ORDER — NORETHINDRONE ACETATE AND ETHINYL ESTRADIOL, AND FERROUS FUMARATE 1MG-20(24)
1 KIT ORAL DAILY
Qty: 30 CAPSULE | Refills: 12 | Status: SHIPPED | OUTPATIENT
Start: 2020-01-15 | End: 2020-01-22

## 2020-01-15 NOTE — TELEPHONE ENCOUNTER
Mother called, pt is experiencing vomiting, nausea and elevated blood sugars, believes it is the OCP.  States she wants to try taytulla again    I have started the PA already

## 2020-01-20 NOTE — TELEPHONE ENCOUNTER
Denied again, tried sending in an appeal will wait on result     Could take up to 30 days     Is there anything else on the list I had sent previous that could be called in in the mean time?

## 2020-01-22 ENCOUNTER — OFFICE VISIT (OUTPATIENT)
Dept: OBSTETRICS AND GYNECOLOGY | Facility: CLINIC | Age: 25
End: 2020-01-22

## 2020-01-22 VITALS
BODY MASS INDEX: 23.74 KG/M2 | HEIGHT: 63 IN | SYSTOLIC BLOOD PRESSURE: 128 MMHG | WEIGHT: 134 LBS | DIASTOLIC BLOOD PRESSURE: 70 MMHG

## 2020-01-22 DIAGNOSIS — Z30.41 ENCOUNTER FOR SURVEILLANCE OF CONTRACEPTIVE PILLS: Primary | ICD-10-CM

## 2020-01-22 PROCEDURE — 99213 OFFICE O/P EST LOW 20 MIN: CPT | Performed by: NURSE PRACTITIONER

## 2020-01-22 RX ORDER — NORETHINDRONE ACETATE AND ETHINYL ESTRADIOL, AND FERROUS FUMARATE 1MG-20(24)
1 KIT ORAL DAILY
Qty: 28 CAPSULE | Refills: 12 | Status: SHIPPED | OUTPATIENT
Start: 2020-01-22 | End: 2020-12-17 | Stop reason: SDUPTHER

## 2020-01-22 NOTE — PROGRESS NOTES
Attempted to obtain health maintenance information, patient unable to provide answers for the following items Mammogram, Colonoscopy, Pneumococcal Vaccine, Medicare Annual Wellness Exam, HPV Vaccine, Chlamydia Screening  and Zoster Vaccine.

## 2020-01-22 NOTE — PROGRESS NOTES
"Subjective     Nelia Chavez is a 24 y.o. female    Patient is here today to discuss birth control pills.  She had been on Taytulla and was doing fine on it and then her insurance wanted to stop it and have her try something else.  She states that her blood sugar went up on the other pill as high as 400 and she could not get it down.    Contraception   This is a recurrent problem. The current episode started more than 1 month ago. The problem occurs daily. Pertinent negatives include no abdominal pain, anorexia, arthralgias, change in bowel habit, chest pain, chills, congestion, coughing, diaphoresis, fatigue, fever, headaches, joint swelling, myalgias, nausea, neck pain, numbness, rash, sore throat, swollen glands, urinary symptoms, vertigo, visual change, vomiting or weakness. Nothing aggravates the symptoms. Treatments tried: Caridad. The treatment provided no relief.         /70   Ht 160 cm (63\")   Wt 60.8 kg (134 lb)   LMP 01/15/2020 (Exact Date)   Breastfeeding No   BMI 23.74 kg/m²     Outpatient Encounter Medications as of 1/22/2020   Medication Sig Dispense Refill   • aluminum hydroxide-mag carbonate (GAVISCON EXTRA RELIEF) 160-105 MG chewable tablet chewable tablet Chew 4 (Four) Times a Day With Meals & at Bedtime.     • Blood Glucose Monitoring Suppl (ACCU-CHEK ALEXANDRE PLUS) w/Device kit See Admin Instructions.  0   • Continuous Blood Gluc Transmit (DEXCOM G6 TRANSMITTER) misc Need one every 3 months, 90-day supply, NDC [address]-60     • GLUCAGON EMERGENCY 1 MG injection TO BE INJECTED AS DIRECTED IN SEVERE HYPOGLYCEMIA EVENT.  11   • glucose blood test strip      • influenza vac split quad (FLUZONE QUADRIVALENT) 0.5 ML suspension prefilled syringe injection TO BE ADMINISTERED BY PHARMACIST FOR IMMUNIZATION     • insulin aspart (novoLOG) 100 UNIT/ML injection Inject  under the skin 3 (Three) Times a Day Before Meals.     • insulin aspart (NOVOLOG) 100 UNIT/ML injection Give subq by insulin " pump basal rates, carb ratios, and correction for up to 55 units/day     • Insulin Disposable Pump (OMNIPOD DASH 5 PACK) misc      • SYNTHROID 25 MCG tablet      • GLUCAGON NA To be injected as directed in severe hypoglycemia event.     • Norethin Ace-Eth Estrad-FE 1-20 MG-MCG(24) capsule Take 1 tablet by mouth Daily. 28 capsule 12   • ondansetron (ZOFRAN) 8 MG tablet Take 1 tablet by mouth Every 8 (Eight) Hours As Needed for Nausea or Vomiting. 30 tablet 3   • [DISCONTINUED] Norethin Ace-Eth Estrad-FE (TAYTULLA) 1-20 MG-MCG(24) capsule Take 1 tablet by mouth Daily. 30 capsule 12     No facility-administered encounter medications on file as of 1/22/2020.        Surgical History  Past Surgical History:   Procedure Laterality Date   • CHOLECYSTECTOMY     • COLONOSCOPY N/A 4/7/2017    Localized mild active inflammation was found in the rectum   • ENDOSCOPY N/A 4/7/2017    Normal exam   • WISDOM TOOTH EXTRACTION         Family History  Family History   Problem Relation Age of Onset   • Diabetes Maternal Grandmother    • Raynaud syndrome Maternal Grandmother    • Coronary artery disease Maternal Grandmother    • Diabetes Maternal Grandfather    • Coronary artery disease Maternal Grandfather    • Diabetes Paternal Grandmother    • Coronary artery disease Paternal Grandmother    • Breast cancer Paternal Grandmother 75   • Coronary artery disease Paternal Grandfather    • No Known Problems Father    • No Known Problems Mother    • No Known Problems Brother    • Colon cancer Neg Hx    • Colon polyps Neg Hx    • Ovarian cancer Neg Hx    • Uterine cancer Neg Hx    • Melanoma Neg Hx    • Prostate cancer Neg Hx        The following portions of the patient's history were reviewed and updated as appropriate: allergies, current medications, past family history, past medical history, past social history, past surgical history and problem list.    Review of Systems   Constitutional: Negative for activity change, appetite change,  chills, diaphoresis, fatigue, fever, unexpected weight gain and unexpected weight loss.   HENT: Negative for congestion, dental problem, drooling, ear discharge, ear pain, facial swelling, hearing loss, mouth sores, nosebleeds, postnasal drip, rhinorrhea, sinus pressure, sneezing, sore throat, swollen glands, tinnitus, trouble swallowing and voice change.    Eyes: Negative for blurred vision, double vision, photophobia, pain, discharge, redness, itching and visual disturbance.   Respiratory: Negative for apnea, cough, choking, chest tightness, shortness of breath, wheezing and stridor.    Cardiovascular: Negative for chest pain, palpitations and leg swelling.   Gastrointestinal: Negative for abdominal distention, abdominal pain, anal bleeding, anorexia, blood in stool, change in bowel habit, constipation, diarrhea, nausea, rectal pain, vomiting, GERD and indigestion.   Endocrine: Negative for cold intolerance, heat intolerance, polydipsia, polyphagia and polyuria.   Genitourinary: Negative for amenorrhea, breast discharge, breast lump, breast pain, decreased libido, decreased urine volume, difficulty urinating, dyspareunia, dysuria, flank pain, frequency, genital sores, hematuria, menstrual problem, pelvic pain, pelvic pressure, urgency, urinary incontinence, vaginal bleeding, vaginal discharge and vaginal pain.   Musculoskeletal: Negative for arthralgias, back pain, gait problem, joint swelling, myalgias, neck pain, neck stiffness and bursitis.   Skin: Negative for color change, dry skin and rash.   Allergic/Immunologic: Negative for environmental allergies, food allergies and immunocompromised state.   Neurological: Negative for dizziness, vertigo, tremors, seizures, syncope, facial asymmetry, speech difficulty, weakness, light-headedness, numbness, headache, memory problem and confusion.   Hematological: Negative for adenopathy. Does not bruise/bleed easily.   Psychiatric/Behavioral: Negative for agitation,  behavioral problems, decreased concentration, dysphoric mood, hallucinations, self-injury, sleep disturbance, suicidal ideas, negative for hyperactivity, depressed mood and stress. The patient is not nervous/anxious.        Objective   Physical Exam   Constitutional: She is oriented to person, place, and time. She appears well-developed and well-nourished.   HENT:   Head: Normocephalic and atraumatic.   Eyes: Conjunctivae are normal. Right eye exhibits no discharge. Left eye exhibits no discharge.   Neck: Normal range of motion. Neck supple. No thyromegaly present.   Cardiovascular: Normal rate, regular rhythm and normal heart sounds.   Pulmonary/Chest: Effort normal and breath sounds normal.   Neurological: She is alert and oriented to person, place, and time.   Skin: Skin is warm and dry.   Psychiatric: She has a normal mood and affect. Her behavior is normal. Judgment and thought content normal.   Nursing note and vitals reviewed.      Assessment/Plan   Nelia was seen today for contraception.    Diagnoses and all orders for this visit:    Encounter for surveillance of contraceptive pills  Comments:  Patient had been on Taytulla.  She was doing well on Taytulla but her insurance stopped paying for.  The pharmacy gave her another pill and it caused her blood sugars to go up to 400 and she could not get it down.  As soon as she stops that pill back to normal with her normal insulin dose.  That pill was a different progestin then Taytulla so I sent her in a generic that has the same progestin we will see how that works.  Orders:  -     Norethin Ace-Eth Estrad-FE 1-20 MG-MCG(24) capsule; Take 1 tablet by mouth Daily.         Patient's Body mass index is 23.74 kg/m². BMI is within normal parameters. No follow-up required..      Anusha Sarkar, APRN  1/22/2020

## 2020-01-22 NOTE — PATIENT INSTRUCTIONS

## 2020-12-17 ENCOUNTER — TELEPHONE (OUTPATIENT)
Dept: OBSTETRICS AND GYNECOLOGY | Facility: CLINIC | Age: 25
End: 2020-12-17

## 2020-12-17 DIAGNOSIS — Z30.41 ENCOUNTER FOR SURVEILLANCE OF CONTRACEPTIVE PILLS: ICD-10-CM

## 2020-12-17 RX ORDER — NORETHINDRONE ACETATE AND ETHINYL ESTRADIOL, AND FERROUS FUMARATE 1MG-20(24)
1 KIT ORAL DAILY
Qty: 28 CAPSULE | Refills: 2 | Status: SHIPPED | OUTPATIENT
Start: 2020-12-17 | End: 2021-02-01 | Stop reason: SDUPTHER

## 2020-12-17 NOTE — TELEPHONE ENCOUNTER
Pt requests OCP with last OV 1/22/2020. Pt began OCP 1/22/2020 and was supposed to RTO in April but pt did not feel comfortable with Covid. Sent pt to scheduling to make appt for yearly. See pended med.

## 2021-02-01 ENCOUNTER — OFFICE VISIT (OUTPATIENT)
Dept: OBSTETRICS AND GYNECOLOGY | Facility: CLINIC | Age: 26
End: 2021-02-01

## 2021-02-01 VITALS
WEIGHT: 141 LBS | DIASTOLIC BLOOD PRESSURE: 80 MMHG | SYSTOLIC BLOOD PRESSURE: 140 MMHG | HEIGHT: 63 IN | BODY MASS INDEX: 24.98 KG/M2

## 2021-02-01 DIAGNOSIS — Z30.41 ENCOUNTER FOR SURVEILLANCE OF CONTRACEPTIVE PILLS: ICD-10-CM

## 2021-02-01 DIAGNOSIS — Z01.419 WELL WOMAN EXAM WITH ROUTINE GYNECOLOGICAL EXAM: Primary | ICD-10-CM

## 2021-02-01 DIAGNOSIS — E03.9 HYPOTHYROIDISM (ACQUIRED): ICD-10-CM

## 2021-02-01 DIAGNOSIS — E10.9 TYPE 1 DIABETES MELLITUS WITHOUT COMPLICATION (HCC): ICD-10-CM

## 2021-02-01 PROCEDURE — 99395 PREV VISIT EST AGE 18-39: CPT | Performed by: NURSE PRACTITIONER

## 2021-02-01 PROCEDURE — G0123 SCREEN CERV/VAG THIN LAYER: HCPCS | Performed by: NURSE PRACTITIONER

## 2021-02-01 RX ORDER — NORETHINDRONE ACETATE AND ETHINYL ESTRADIOL, AND FERROUS FUMARATE 1MG-20(24)
1 KIT ORAL DAILY
Qty: 28 CAPSULE | Refills: 12 | Status: SHIPPED | OUTPATIENT
Start: 2021-02-01 | End: 2021-02-08

## 2021-02-01 NOTE — PROGRESS NOTES
"Subjective     Nelia Whitehead is a 25 y.o. female    Patient is here today for yearly checkup.  She has no GYN complaints.  She is followed at [facility] for type 1 diabetes and hypothyroid.  She feels that her sugar is not under control.  The last A1c she had was 7.3 but that was done in October.  She states that her sugar will often bottom out.  She has an appointment at [facility] in March.    Gynecologic Exam  The patient's pertinent negatives include no pelvic pain, vaginal bleeding or vaginal discharge. The patient is experiencing no pain. Pertinent negatives include no abdominal pain, anorexia, back pain, chills, constipation, diarrhea, discolored urine, dysuria, fever, flank pain, frequency, headaches, hematuria, joint pain, joint swelling, nausea, painful intercourse, rash, sore throat, urgency or vomiting. She is sexually active. She uses oral contraceptives for contraception. Her menstrual history has been regular.         /80   Ht 160 cm (62.99\")   Wt 64 kg (141 lb)   LMP  (LMP Unknown)   BMI 24.98 kg/m²     Outpatient Encounter Medications as of 2/1/2021   Medication Sig Dispense Refill   • aluminum hydroxide-mag carbonate (GAVISCON EXTRA RELIEF) 160-105 MG chewable tablet chewable tablet Chew 4 (Four) Times a Day With Meals & at Bedtime.     • Blood Glucose Monitoring Suppl (ACCU-CHEK ALEXANDRE PLUS) w/Device kit See Admin Instructions.  0   • Continuous Blood Gluc Transmit (DEXCOM G6 TRANSMITTER) misc Need one every 3 months, 90-day supply, NDC [address]-04     • GLUCAGON EMERGENCY 1 MG injection TO BE INJECTED AS DIRECTED IN SEVERE HYPOGLYCEMIA EVENT.  11   • GLUCAGON NA To be injected as directed in severe hypoglycemia event.     • glucose blood test strip      • insulin aspart (novoLOG) 100 UNIT/ML injection Inject  under the skin 3 (Three) Times a Day Before Meals.     • insulin aspart (NOVOLOG) 100 UNIT/ML injection Give subq by insulin pump basal rates, carb ratios, and correction " for up to 55 units/day     • Insulin Disposable Pump (OMNIPOD DASH 5 PACK) misc      • Norethin Ace-Eth Estrad-FE 1-20 MG-MCG(24) capsule Take 1 tablet by mouth Daily. 28 capsule 12   • ondansetron (ZOFRAN) 8 MG tablet Take 1 tablet by mouth Every 8 (Eight) Hours As Needed for Nausea or Vomiting. 30 tablet 3   • SYNTHROID 25 MCG tablet      • [DISCONTINUED] Norethin Ace-Eth Estrad-FE 1-20 MG-MCG(24) capsule Take 1 tablet by mouth Daily. 28 capsule 2   • [DISCONTINUED] influenza vac split quad (FLUZONE QUADRIVALENT) 0.5 ML suspension prefilled syringe injection TO BE ADMINISTERED BY PHARMACIST FOR IMMUNIZATION       No facility-administered encounter medications on file as of 2/1/2021.        Surgical History  Past Surgical History:   Procedure Laterality Date   • CHOLECYSTECTOMY     • COLONOSCOPY N/A 4/7/2017    Localized mild active inflammation was found in the rectum   • ENDOSCOPY N/A 4/7/2017    Normal exam   • WISDOM TOOTH EXTRACTION         Family History  Family History   Problem Relation Age of Onset   • Diabetes Maternal Grandmother    • Raynaud syndrome Maternal Grandmother    • Coronary artery disease Maternal Grandmother    • Diabetes Maternal Grandfather    • Coronary artery disease Maternal Grandfather    • Diabetes Paternal Grandmother    • Coronary artery disease Paternal Grandmother    • Breast cancer Paternal Grandmother 75   • Coronary artery disease Paternal Grandfather    • No Known Problems Father    • No Known Problems Mother    • No Known Problems Brother    • Colon cancer Neg Hx    • Colon polyps Neg Hx    • Ovarian cancer Neg Hx    • Uterine cancer Neg Hx    • Melanoma Neg Hx    • Prostate cancer Neg Hx        The following portions of the patient's history were reviewed and updated as appropriate: allergies, current medications, past family history, past medical history, past social history, past surgical history and problem list.    Review of Systems   Constitutional: Negative for  activity change, appetite change, chills, diaphoresis, fatigue, fever, unexpected weight gain and unexpected weight loss.   HENT: Negative for congestion, dental problem, drooling, ear discharge, ear pain, facial swelling, hearing loss, mouth sores, nosebleeds, postnasal drip, rhinorrhea, sinus pressure, sneezing, sore throat, swollen glands, tinnitus, trouble swallowing and voice change.    Eyes: Negative for blurred vision, double vision, photophobia, pain, discharge, redness, itching and visual disturbance.   Respiratory: Negative for apnea, cough, choking, chest tightness, shortness of breath, wheezing and stridor.    Cardiovascular: Negative for chest pain, palpitations and leg swelling.   Gastrointestinal: Negative for abdominal distention, abdominal pain, anal bleeding, anorexia, blood in stool, constipation, diarrhea, nausea, rectal pain, vomiting, GERD and indigestion.   Endocrine: Negative for cold intolerance, heat intolerance, polydipsia, polyphagia and polyuria.   Genitourinary: Negative for amenorrhea, breast discharge, breast lump, breast pain, decreased libido, decreased urine volume, difficulty urinating, dyspareunia, dysuria, flank pain, frequency, genital sores, hematuria, menstrual problem, pelvic pain, pelvic pressure, urgency, urinary incontinence, vaginal bleeding, vaginal discharge and vaginal pain.   Musculoskeletal: Negative for arthralgias, back pain, gait problem, joint pain, joint swelling, myalgias, neck pain, neck stiffness and bursitis.   Skin: Negative for color change, dry skin and rash.   Allergic/Immunologic: Negative for environmental allergies, food allergies and immunocompromised state.   Neurological: Negative for dizziness, tremors, seizures, syncope, facial asymmetry, speech difficulty, weakness, light-headedness, numbness, headache, memory problem and confusion.   Hematological: Negative for adenopathy. Does not bruise/bleed easily.   Psychiatric/Behavioral: Negative for  agitation, behavioral problems, decreased concentration, dysphoric mood, hallucinations, self-injury, sleep disturbance, suicidal ideas, negative for hyperactivity, depressed mood and stress. The patient is not nervous/anxious.        Objective   Physical Exam  Vitals signs and nursing note reviewed. Exam conducted with a chaperone present.   Constitutional:       General: She is not in acute distress.     Appearance: She is well-developed. She is not diaphoretic.   HENT:      Head: Normocephalic.      Right Ear: External ear normal.      Left Ear: External ear normal.      Nose: Nose normal.   Eyes:      General: No scleral icterus.        Right eye: No discharge.         Left eye: No discharge.      Conjunctiva/sclera: Conjunctivae normal.      Pupils: Pupils are equal, round, and reactive to light.   Neck:      Musculoskeletal: Normal range of motion and neck supple.      Thyroid: Thyromegaly present.      Vascular: No carotid bruit.      Trachea: No tracheal deviation.   Cardiovascular:      Rate and Rhythm: Normal rate and regular rhythm.      Heart sounds: Normal heart sounds. No murmur.   Pulmonary:      Effort: Pulmonary effort is normal. No respiratory distress.      Breath sounds: Normal breath sounds. No wheezing.   Chest:      Breasts: Breasts are symmetrical.         Right: Normal. No swelling, bleeding, inverted nipple, mass, nipple discharge, skin change or tenderness.         Left: Normal. No swelling, bleeding, inverted nipple, mass, nipple discharge, skin change or tenderness.   Abdominal:      General: There is no distension.      Palpations: Abdomen is soft. There is no mass.      Tenderness: There is no abdominal tenderness. There is no right CVA tenderness, left CVA tenderness or guarding.      Hernia: No hernia is present. There is no hernia in the left inguinal area or right inguinal area.   Genitourinary:     General: Normal vulva.      Exam position: Lithotomy position.      Labia:          Right: No rash, tenderness, lesion or injury.         Left: No rash, tenderness, lesion or injury.       Vagina: Normal. No signs of injury and foreign body. No vaginal discharge, erythema, tenderness or bleeding.      Cervix: Normal.      Uterus: Normal. Not enlarged, not fixed and not tender.       Adnexa: Right adnexa normal and left adnexa normal.        Right: No mass, tenderness or fullness.          Left: No mass, tenderness or fullness.        Rectum: Normal. No mass.      Comments:   BSU normal  Urethral meatus  Normal  Perineum  Normal  Musculoskeletal: Normal range of motion.         General: No tenderness.   Lymphadenopathy:      Head:      Right side of head: No submental, submandibular, tonsillar, preauricular, posterior auricular or occipital adenopathy.      Left side of head: No submental, submandibular, tonsillar, preauricular, posterior auricular or occipital adenopathy.      Cervical: No cervical adenopathy.      Right cervical: No superficial, deep or posterior cervical adenopathy.     Left cervical: No superficial, deep or posterior cervical adenopathy.      Upper Body:      Right upper body: No supraclavicular, axillary or pectoral adenopathy.      Left upper body: No supraclavicular, axillary or pectoral adenopathy.      Lower Body: No right inguinal adenopathy. No left inguinal adenopathy.   Skin:     General: Skin is warm and dry.      Findings: No bruising, erythema or rash.   Neurological:      Mental Status: She is alert and oriented to person, place, and time.      Coordination: Coordination normal.   Psychiatric:         Mood and Affect: Mood normal.         Behavior: Behavior normal.         Thought Content: Thought content normal.         Judgment: Judgment normal.         Assessment/Plan   Diagnoses and all orders for this visit:    1. Well woman exam with routine gynecological exam (Primary)  Normal GYN exam. Will have lab work at [place]. Encouraged SBE, pt is aware how to do  self breast exam and the importance of same. Discussed weight management and importance of maintaining a healthy weight. Discussed Vitamin D intake and the importance of adequate vitamin D for both Bone Health and a healthy immune system.  Discussed Daily exercise and the importance of same, in regards to a healthy heart as well as helping to maintain her weight and improving her mental health.  BMI 25. Pap smear is done per ASCCP guidelines.  -     Liquid-based Pap Smear, Screening    2. Encounter for surveillance of contraceptive pills  Comments:  Doing well on pills. Rf sent to Pharmacy.  Orders:  -     Norethin Ace-Eth Estrad-FE 1-20 MG-MCG(24) capsule; Take 1 tablet by mouth Daily.  Dispense: 28 capsule; Refill: 12    3. Type 1 diabetes mellitus without complication (CMS/HCC)  Comments:  Followed at [facility]. She sees them in March. Her last A1c was 7.3. She does not think her sugar is well controlled.  I reviewed her notes from [place].  We also discussed that she needs to have her A1c every 3 months.  I offered to draw that for her today but she wants to wait until she sees [facility].    4. Hypothyroidism (acquired)  Comments:  Pt is followed at [facility]. Sees them in March.  Her thyroid was mildly enlarged today.       Patient's Body mass index is 24.98 kg/m². BMI is within normal parameters. No follow-up required..      Anusha Sarkar, APRN  2/1/2021

## 2021-02-01 NOTE — PATIENT INSTRUCTIONS
"BMI for Adults  What is BMI?  Body mass index (BMI) is a number that is calculated from a person's weight and height. BMI can help estimate how much of a person's weight is composed of fat. BMI does not measure body fat directly. Rather, it is an alternative to procedures that directly measure body fat, which can be difficult and expensive.  BMI can help identify people who may be at higher risk for certain medical problems.  What are BMI measurements used for?  BMI is used as a screening tool to identify possible weight problems. It helps determine whether a person is obese, overweight, a healthy weight, or underweight.  BMI is useful for:  · Identifying a weight problem that may be related to a medical condition or may increase the risk for medical problems.  · Promoting changes, such as changes in diet and exercise, to help reach a healthy weight. BMI screening can be repeated to see if these changes are working.  How is BMI calculated?  BMI involves measuring your weight in relation to your height. Both height and weight are measured, and the BMI is calculated from those numbers. This can be done either in English (U.S[place] or metric measurements. Note that charts and online BMI calculators are available to help you find your BMI quickly and easily without having to do these calculations yourself.  To calculate your BMI in English (U.S[place] measurements:    1. Measure your weight in pounds (lb).  2. Multiply the number of pounds by 703.  ? For example, for a person who weighs 180 lb, multiply that number by 703, which equals 126,540.  3. Measure your height in inches. Then multiply that number by itself to get a measurement called \"inches squared.\"  ? For example, for a person who is 70 inches tall, the \"inches squared\" measurement is 70 inches x 70 inches, which equals 4,900 inches squared.  4. Divide the total from step 2 (number of lb x 703) by the total from step 3 (inches squared): 126,540 ÷ 4,900 = 25.8. This is " "your BMI.  To calculate your BMI in metric measurements:  1. Measure your weight in kilograms (kg).  2. Measure your height in meters (m). Then multiply that number by itself to get a measurement called \"meters squared.\"  ? For example, for a person who is 1.75 m tall, the \"meters squared\" measurement is 1.75 m x 1.75 m, which is equal to 3.1 meters squared.  3. Divide the number of kilograms (your weight) by the meters squared number. In this example: 70 ÷ 3.1 = 22.6. This is your BMI.  What do the results mean?  BMI charts are used to identify whether you are underweight, normal weight, overweight, or obese. The following guidelines will be used:  · Underweight: BMI less than 18.5.  · Normal weight: BMI between 18.5 and 24.9.  · Overweight: BMI between 25 and 29.9.  · Obese: BMI of 30 or above.  Keep these notes in mind:  · Weight includes both fat and muscle, so someone with a muscular build, such as an athlete, may have a BMI that is higher than 24.9. In cases like these, BMI is not an accurate measure of body fat.  · To determine if excess body fat is the cause of a BMI of 25 or higher, further assessments may need to be done by a health care provider.  · BMI is usually interpreted in the same way for men and women.  Where to find more information  For more information about BMI, including tools to quickly calculate your BMI, go to these websites:  · Centers for Disease Control and Prevention: www.cdc.gov  · American Heart Association: www.heart.org  · National Heart, Lung, and Blood [place]: www.nhlbi.nih.gov  Summary  · Body mass index (BMI) is a number that is calculated from a person's weight and height.  · BMI may help estimate how much of a person's weight is composed of fat. BMI can help identify those who may be at higher risk for certain medical problems.  · BMI can be measured using English measurements or metric measurements.  · BMI charts are used to identify whether you are underweight, normal " weight, overweight, or obese.  This information is not intended to replace advice given to you by your health care provider. Make sure you discuss any questions you have with your health care provider.  Document Revised: 09/09/2020 Document Reviewed: 07/17/2020  Elsevier Patient Education © 2020 [employer]

## 2021-02-02 ENCOUNTER — PRIOR AUTHORIZATION (OUTPATIENT)
Dept: OBSTETRICS AND GYNECOLOGY | Facility: CLINIC | Age: 26
End: 2021-02-02

## 2021-02-02 NOTE — TELEPHONE ENCOUNTER
"When patient was in office she requested a PA be done on her OCP. PA done through cover my meds. Message after completing PA is as follows, \"This medication or product is on your plan's list of covered drugs. Prior authorization is not required at this time.\" Attempted to call pt but got no answer. LVM for pt to call office regarding this matter.   "

## 2021-02-03 NOTE — TELEPHONE ENCOUNTER
Patient left message returning Yari's call. Attempted to reach patient but no answer again. LM to call us back. Pt also has pap result she needs informed of.

## 2021-02-08 RX ORDER — NORETHINDRONE ACETATE AND ETHINYL ESTRADIOL, AND FERROUS FUMARATE 1MG-20(24)
1 KIT ORAL DAILY
Qty: 30 CAPSULE | Refills: 12 | Status: SHIPPED | OUTPATIENT
Start: 2021-02-08 | End: 2022-06-16

## 2021-03-25 ENCOUNTER — TRANSCRIBE ORDERS (OUTPATIENT)
Dept: ADMINISTRATIVE | Facility: HOSPITAL | Age: 26
End: 2021-03-25

## 2021-03-25 ENCOUNTER — HOSPITAL ENCOUNTER (OUTPATIENT)
Dept: GENERAL RADIOLOGY | Facility: HOSPITAL | Age: 26
Discharge: HOME | End: 2021-03-25
Admitting: PHYSICIAN ASSISTANT

## 2021-03-25 DIAGNOSIS — M79.675 PAIN IN LEFT TOE(S): Primary | ICD-10-CM

## 2021-03-25 DIAGNOSIS — M79.675 PAIN IN LEFT TOE(S): ICD-10-CM

## 2021-03-25 PROCEDURE — 73630 X-RAY EXAM OF FOOT: CPT

## 2021-04-21 NOTE — PROGRESS NOTES
[facility] - PODIATRY    Today's Date: 04/27/21    Patient Name: Nelia Whitehead  MRN: [id number]  CSN: [id number]  PCP: Sheila Quintana DO  Referring Provider: Sheila Quintana,*    SUBJECTIVE     Chief Complaint   Patient presents with   • Establish Care     pt is here for pain in the left foot - pt denies any pain at this moment - pt stated that she had swelling in the left foot great toe x1mo ago - pt dneies any trauma and or injury - pcp 01/29/2021   • Diabetes     last blood sugar reading is 178mg/dl      HPI: Nelia Whitehead, a 26 y.o.female, comes to clinic as a(n) new patient presenting for diabetic foot exam and complaining of irregular left hallux nail. Patient has h/o DM1, thyromegaly. Patient is IDDM with last stated BG level of 178mg/dl. Relates that for the past year she has noticed issues with her left hallux nail. It has been raised and has a slight ridge. Denies redness or drainage. Does not remember any injury or trauma to the nail. Denies any numbness or tingling to her feet. Denies pain. Denies previous treatment. Denies any constitutional symptoms. No other pedal complaints at this time.    Past Medical History:   Diagnosis Date   • Diabetes mellitus (CMS/HCC)     type 1. Pt is on insulin pump   • Dysmenorrhea    • Thyromegaly      Past Surgical History:   Procedure Laterality Date   • CHOLECYSTECTOMY     • COLONOSCOPY N/A 4/7/2017    Localized mild active inflammation was found in the rectum   • ENDOSCOPY N/A 4/7/2017    Normal exam   • WISDOM TOOTH EXTRACTION       Family History   Problem Relation Age of Onset   • Diabetes Maternal Grandmother    • Raynaud syndrome Maternal Grandmother    • Coronary artery disease Maternal Grandmother    • Diabetes Maternal Grandfather    • Coronary artery disease Maternal Grandfather    • Diabetes Paternal Grandmother    • Coronary artery disease Paternal Grandmother    • Breast cancer Paternal Grandmother 75   •  Coronary artery disease Paternal Grandfather    • No Known Problems Father    • No Known Problems Mother    • No Known Problems Brother    • Colon cancer Neg Hx    • Colon polyps Neg Hx    • Ovarian cancer Neg Hx    • Uterine cancer Neg Hx    • Melanoma Neg Hx    • Prostate cancer Neg Hx      Social History     Socioeconomic History   • Marital status: Single     Spouse name: Not on file   • Number of children: Not on file   • Years of education: Not on file   • Highest education level: Not on file   Tobacco Use   • Smoking status: Never Smoker   • Smokeless tobacco: Never Used   Vaping Use   • Vaping Use: Never used   Substance and Sexual Activity   • Alcohol use: Yes     Comment: liquor a few nights per week   • Drug use: No   • Sexual activity: Yes     Birth control/protection: OCP     No Known Allergies  Current Outpatient Medications   Medication Sig Dispense Refill   • aluminum hydroxide-mag carbonate (GAVISCON EXTRA RELIEF) 160-105 MG chewable tablet chewable tablet Chew 4 (Four) Times a Day With Meals & at Bedtime.     • Blood Glucose Monitoring Suppl (ACCU-CHEK ALEXANDRE PLUS) w/Device kit See Admin Instructions.  0   • glucose blood test strip      • HumaLOG 100 UNIT/ML injection USE IN INSULIN PUMP UP TO 60 UNITS A DAY     • Insulin Disposable Pump (OMNIPOD DASH 5 PACK) misc      • levothyroxine (SYNTHROID, LEVOTHROID) 50 MCG tablet Take 50 mcg by mouth Daily.     • Taytulla 1-20 MG-MCG(24) capsule Take 1 tablet by mouth Daily. 30 capsule 12     No current facility-administered medications for this visit.     Review of Systems   Constitutional: Negative for chills and fever.   HENT: Negative for congestion.    Respiratory: Negative for shortness of breath.    Cardiovascular: Negative for chest pain and leg swelling.   Gastrointestinal: Negative for constipation, diarrhea, nausea and vomiting.   Musculoskeletal: Negative for gait problem.   Skin: Negative for wound.   Neurological: Negative for numbness.        OBJECTIVE     Vitals:    04/27/21 1523   BP: 108/60   Pulse: 67   SpO2: 99%       PHYSICAL EXAM  GEN:   Accompanied by none.     Foot/Ankle Exam:       General:   Diabetic Foot Exam Performed    Appearance: appears stated age and healthy    Orientation: AAOx3    Affect: appropriate    Gait: unimpaired    Assistance: independent    Shoe Gear:  Casual shoes    VASCULAR      Right Foot Vascularity   Dorsalis pedis:  2+  Posterior tibial:  2+  Skin Temperature: warm    Edema Grading:  None  CFT:  3  Pedal Hair Growth:  Present  Varicosities: none       Left Foot Vascularity   Dorsalis pedis:  2+  Posterior tibial:  2+  Skin Temperature: warm    Edema Grading:  None  CFT:  3  Pedal Hair Growth:  Present  Varicosities: none        NEUROLOGIC     Right Foot Neurologic   Normal sensation    Light touch sensation:  Normal  Vibratory sensation:  Normal  Hot/Cold sensation: normal    Protective Sensation using [place] Monofilament:  10     Left Foot Neurologic   Normal sensation    Light touch sensation:  Normal  Vibratory sensation:  Normal  Hot/cold sensation: normal    Protective Sensation using [place] Monofilament:  10     MUSCULOSKELETAL      Right Foot Musculoskeletal   Ecchymosis:  None  Tenderness: none    Arch:  Normal     Left Foot Musculoskeletal   Ecchymosis:  None  Tenderness: none    Arch:  Normal     MUSCLE STRENGTH     Right Foot Muscle Strength   Foot dorsiflexion:  5  Foot plantar flexion:  5  Foot inversion:  5  Foot eversion:  5     Left Foot Muscle Strength   Foot dorsiflexion:  5  Foot plantar flexion:  5  Foot inversion:  5  Foot eversion:  5     RANGE OF MOTION      Right Foot Range of Motion   Foot and ankle ROM within normal limits       Left Foot Range of Motion   Foot and ankle ROM within normal limits       DERMATOLOGIC     Right Foot Dermatologic   Skin: skin intact       Left Foot Dermatologic   Skin: skin intact    Nails: horizontal ridges (hallux)         RADIOLOGY/NUCLEAR:  No results found.    LABORATORY/CULTURE RESULTS:      PATHOLOGY RESULTS:       ASSESSMENT/PLAN     Diagnoses and all orders for this visit:    1. Dystrophic nail (Primary)    2. Type 1 diabetes mellitus without complication (CMS/[facility])      Comprehensive lower extremity examination and evaluation was performed.  Discussed findings and treatment plan including risks, benefits, and treatment options with patient in detail. Patient agreed with treatment plan.  Most likely had mild trauma to the nail months ago. No significant pathology noted. Allow nail to grow out.  Patient may maintain nails and calluses at home utilizing emery board or pumice stone between visits as needed  Reviewed at home diabetic foot care including daily foot checks   An After Visit Summary was printed and given to the patient at discharge, including (if requested) any available informative/educational handouts regarding diagnosis, treatment, or medications. All questions were answered to patient/family satisfaction. Should symptoms fail to improve or worsen they agree to call or return to clinic or to go to the Emergency Department. Discussed the importance of following up with any needed screening tests/labs/specialist appointments and any requested follow-up recommended by me today. Importance of maintaining follow-up discussed and patient accepts that missed appointments can delay diagnosis and potentially lead to worsening of conditions.  Return in about 1 year (around 4/27/2022)., or sooner if acute issues arise.        This document has been electronically signed by Kishor March DPM on April 27, 2021 15:42 CDT

## 2021-04-26 ENCOUNTER — TELEPHONE (OUTPATIENT)
Dept: VASCULAR SURGERY | Facility: CLINIC | Age: 26
End: 2021-04-26

## 2021-04-27 ENCOUNTER — OFFICE VISIT (OUTPATIENT)
Dept: PODIATRY | Facility: CLINIC | Age: 26
End: 2021-04-27

## 2021-04-27 VITALS
WEIGHT: 139.2 LBS | HEART RATE: 67 BPM | DIASTOLIC BLOOD PRESSURE: 60 MMHG | HEIGHT: 63 IN | SYSTOLIC BLOOD PRESSURE: 108 MMHG | OXYGEN SATURATION: 99 % | BODY MASS INDEX: 24.66 KG/M2

## 2021-04-27 DIAGNOSIS — L60.3 DYSTROPHIC NAIL: Primary | ICD-10-CM

## 2021-04-27 DIAGNOSIS — E10.9 TYPE 1 DIABETES MELLITUS WITHOUT COMPLICATION (HCC): ICD-10-CM

## 2021-04-27 PROCEDURE — 99203 OFFICE O/P NEW LOW 30 MIN: CPT | Performed by: PODIATRIST

## 2021-04-27 RX ORDER — PROCHLORPERAZINE 25 MG/1
SUPPOSITORY RECTAL
COMMUNITY
Start: 2021-04-05 | End: 2021-04-27

## 2021-04-27 RX ORDER — LEVOTHYROXINE SODIUM 0.05 MG/1
50 TABLET ORAL DAILY
COMMUNITY
Start: 2021-03-07 | End: 2022-12-22

## 2022-06-16 ENCOUNTER — OFFICE VISIT (OUTPATIENT)
Dept: OBSTETRICS AND GYNECOLOGY | Facility: CLINIC | Age: 27
End: 2022-06-16

## 2022-06-16 VITALS
HEIGHT: 63 IN | WEIGHT: 134 LBS | SYSTOLIC BLOOD PRESSURE: 116 MMHG | DIASTOLIC BLOOD PRESSURE: 64 MMHG | BODY MASS INDEX: 23.74 KG/M2

## 2022-06-16 DIAGNOSIS — Z31.9 INFERTILITY MANAGEMENT: ICD-10-CM

## 2022-06-16 DIAGNOSIS — Z11.3 SCREENING EXAMINATION FOR STD (SEXUALLY TRANSMITTED DISEASE): ICD-10-CM

## 2022-06-16 DIAGNOSIS — E10.9 TYPE 1 DIABETES MELLITUS WITHOUT COMPLICATION: ICD-10-CM

## 2022-06-16 DIAGNOSIS — Z31.69 PRE-CONCEPTION COUNSELING: ICD-10-CM

## 2022-06-16 DIAGNOSIS — Z01.419 WELL WOMAN EXAM WITH ROUTINE GYNECOLOGICAL EXAM: Primary | ICD-10-CM

## 2022-06-16 PROCEDURE — 87491 CHLMYD TRACH DNA AMP PROBE: CPT | Performed by: NURSE PRACTITIONER

## 2022-06-16 PROCEDURE — G0123 SCREEN CERV/VAG THIN LAYER: HCPCS | Performed by: NURSE PRACTITIONER

## 2022-06-16 PROCEDURE — 99395 PREV VISIT EST AGE 18-39: CPT | Performed by: NURSE PRACTITIONER

## 2022-06-16 PROCEDURE — 87591 N.GONORRHOEAE DNA AMP PROB: CPT | Performed by: NURSE PRACTITIONER

## 2022-06-16 PROCEDURE — 99213 OFFICE O/P EST LOW 20 MIN: CPT | Performed by: NURSE PRACTITIONER

## 2022-06-16 RX ORDER — PROCHLORPERAZINE 25 MG/1
SUPPOSITORY RECTAL
COMMUNITY
Start: 2022-06-04

## 2022-06-16 NOTE — PROGRESS NOTES
"Subjective     Nelia Whitehead is a 27 y.o. female    Patient is here today for yearly checkup.  She is currently trying to get pregnant.  She has been trying for over a year with no success.  She did have a positive pregnancy test at home in May.  Then had a follow-up pregnancy test and a quantitative hCG done at her doctor's office that was negative.  She states she is having regular periods.    Gynecologic Exam  The patient's pertinent negatives include no pelvic pain, vaginal bleeding or vaginal discharge. The patient is experiencing no pain. Pertinent negatives include no abdominal pain, anorexia, back pain, chills, constipation, diarrhea, discolored urine, dysuria, fever, flank pain, frequency, headaches, hematuria, joint pain, joint swelling, nausea, painful intercourse, rash, sore throat, urgency or vomiting. She is sexually active. She uses nothing for contraception. Her menstrual history has been regular.         /64   Ht 160 cm (62.99\")   Wt 60.8 kg (134 lb)   LMP 06/03/2022 (Exact Date)   Breastfeeding No   BMI 23.74 kg/m²     Outpatient Encounter Medications as of 6/16/2022   Medication Sig Dispense Refill   • aluminum hydroxide-mag carbonate (GAVISCON EXTRA RELIEF) 160-105 MG chewable tablet chewable tablet Chew 4 (Four) Times a Day With Meals & at Bedtime.     • Blood Glucose Monitoring Suppl (ACCU-CHEK ALEXANDRE PLUS) w/Device kit See Admin Instructions.  0   • glucose blood test strip      • HumaLOG 100 UNIT/ML injection USE IN INSULIN PUMP UP TO 60 UNITS A DAY     • Insulin Disposable Pump (OMNIPOD DASH 5 PACK) misc      • levothyroxine (SYNTHROID, LEVOTHROID) 50 MCG tablet Take 50 mcg by mouth Daily.     • Continuous Blood Gluc Sensor (Dexcom G6 Sensor) USE AS DIRECTED. CHANGE EVERY 10 DAYS.     • [DISCONTINUED] Taytulla 1-20 MG-MCG(24) capsule Take 1 tablet by mouth Daily. 30 capsule 12     No facility-administered encounter medications on file as of 6/16/2022.       Surgical " History  Past Surgical History:   Procedure Laterality Date   • CHOLECYSTECTOMY     • COLONOSCOPY N/A 4/7/2017    Localized mild active inflammation was found in the rectum   • ENDOSCOPY N/A 4/7/2017    Normal exam   • WISDOM TOOTH EXTRACTION         Family History  Family History   Problem Relation Age of Onset   • No Known Problems Father    • No Known Problems Mother    • No Known Problems Brother    • Colon cancer Paternal Grandfather 70   • Coronary artery disease Paternal Grandfather    • Diabetes Paternal Grandmother    • Coronary artery disease Paternal Grandmother    • Breast cancer Paternal Grandmother 75   • Diabetes Maternal Grandmother    • Raynaud syndrome Maternal Grandmother    • Coronary artery disease Maternal Grandmother    • Diabetes Maternal Grandfather    • Coronary artery disease Maternal Grandfather    • Colon polyps Neg Hx    • Ovarian cancer Neg Hx    • Uterine cancer Neg Hx    • Melanoma Neg Hx    • Prostate cancer Neg Hx        The following portions of the patient's history were reviewed and updated as appropriate: allergies, current medications, past family history, past medical history, past social history, past surgical history, and problem list.    Review of Systems   Constitutional: Negative for activity change, appetite change, chills, diaphoresis, fatigue, fever, unexpected weight gain and unexpected weight loss.   HENT: Negative for congestion, dental problem, drooling, ear discharge, ear pain, facial swelling, hearing loss, mouth sores, nosebleeds, postnasal drip, rhinorrhea, sinus pressure, sneezing, sore throat, swollen glands, tinnitus, trouble swallowing and voice change.    Eyes: Negative for blurred vision, double vision, photophobia, pain, discharge, redness, itching and visual disturbance.   Respiratory: Negative for apnea, cough, choking, chest tightness, shortness of breath, wheezing and stridor.    Cardiovascular: Negative for chest pain, palpitations and leg  swelling.   Gastrointestinal: Negative for abdominal distention, abdominal pain, anal bleeding, anorexia, blood in stool, constipation, diarrhea, nausea, rectal pain, vomiting, GERD and indigestion.   Endocrine: Negative for cold intolerance, heat intolerance, polydipsia, polyphagia and polyuria.   Genitourinary: Negative for amenorrhea, breast discharge, breast lump, breast pain, decreased libido, decreased urine volume, difficulty urinating, dyspareunia, dysuria, flank pain, frequency, genital sores, hematuria, menstrual problem, pelvic pain, pelvic pressure, urgency, urinary incontinence, vaginal bleeding, vaginal discharge and vaginal pain.   Musculoskeletal: Negative for arthralgias, back pain, gait problem, joint pain, joint swelling, myalgias, neck pain, neck stiffness and bursitis.   Skin: Negative for color change, dry skin and rash.   Allergic/Immunologic: Negative for environmental allergies, food allergies and immunocompromised state.   Neurological: Negative for dizziness, tremors, seizures, syncope, facial asymmetry, speech difficulty, weakness, light-headedness, numbness, headache, memory problem and confusion.   Hematological: Negative for adenopathy. Does not bruise/bleed easily.   Psychiatric/Behavioral: Negative for agitation, behavioral problems, decreased concentration, dysphoric mood, hallucinations, self-injury, sleep disturbance, suicidal ideas, negative for hyperactivity, depressed mood and stress. The patient is not nervous/anxious.        Objective   Physical Exam  Vitals and nursing note reviewed. Exam conducted with a chaperone present.   Constitutional:       General: She is not in acute distress.     Appearance: She is well-developed. She is not diaphoretic.   HENT:      Head: Normocephalic.      Right Ear: External ear normal.      Left Ear: External ear normal.      Nose: Nose normal.   Eyes:      General: No scleral icterus.        Right eye: No discharge.         Left eye: No  discharge.      Conjunctiva/sclera: Conjunctivae normal.      Pupils: Pupils are equal, round, and reactive to light.   Neck:      Thyroid: No thyromegaly.      Vascular: No carotid bruit.      Trachea: No tracheal deviation.   Cardiovascular:      Rate and Rhythm: Normal rate and regular rhythm.      Heart sounds: Normal heart sounds. No murmur heard.  Pulmonary:      Effort: Pulmonary effort is normal. No respiratory distress.      Breath sounds: Normal breath sounds. No wheezing.   Chest:   Breasts: Breasts are symmetrical.      Right: Normal. No swelling, bleeding, inverted nipple, mass, nipple discharge, skin change, tenderness, axillary adenopathy or supraclavicular adenopathy.      Left: Normal. No swelling, bleeding, inverted nipple, mass, nipple discharge, skin change, tenderness, axillary adenopathy or supraclavicular adenopathy.       Abdominal:      General: There is no distension.      Palpations: Abdomen is soft. There is no mass.      Tenderness: There is no abdominal tenderness. There is no right CVA tenderness, left CVA tenderness or guarding.      Hernia: No hernia is present. There is no hernia in the left inguinal area or right inguinal area.   Genitourinary:     General: Normal vulva.      Exam position: Lithotomy position.      Labia:         Right: No rash, tenderness, lesion or injury.         Left: No rash, tenderness, lesion or injury.       Vagina: Normal. No signs of injury and foreign body. No vaginal discharge, erythema, tenderness or bleeding.      Cervix: Normal.      Uterus: Normal. Not enlarged, not fixed and not tender.       Adnexa: Right adnexa normal and left adnexa normal.        Right: No mass, tenderness or fullness.          Left: No mass, tenderness or fullness.        Rectum: Normal. No mass.      Comments:   BSU normal  Urethral meatus  Normal  Perineum  Normal  Musculoskeletal:         General: No tenderness. Normal range of motion.      Cervical back: Normal range of  motion and neck supple.   Lymphadenopathy:      Head:      Right side of head: No submental, submandibular, tonsillar, preauricular, posterior auricular or occipital adenopathy.      Left side of head: No submental, submandibular, tonsillar, preauricular, posterior auricular or occipital adenopathy.      Cervical: No cervical adenopathy.      Right cervical: No superficial, deep or posterior cervical adenopathy.     Left cervical: No superficial, deep or posterior cervical adenopathy.      Upper Body:      Right upper body: No supraclavicular, axillary or pectoral adenopathy.      Left upper body: No supraclavicular, axillary or pectoral adenopathy.      Lower Body: No right inguinal adenopathy. No left inguinal adenopathy.   Skin:     General: Skin is warm and dry.      Findings: No bruising, erythema or rash.   Neurological:      Mental Status: She is alert and oriented to person, place, and time.      Coordination: Coordination normal.   Psychiatric:         Mood and Affect: Mood normal.         Behavior: Behavior normal.         Thought Content: Thought content normal.         Judgment: Judgment normal.         Assessment & Plan   Diagnoses and all orders for this visit:    1. Well woman exam with routine gynecological exam (Primary)  Normal GYN exam. Will have lab work at [facility]. Encouraged SBE, pt is aware how to do self breast exam and the importance of same. Discussed weight management and importance of maintaining a healthy weight. Discussed Vitamin D intake and the importance of adequate vitamin D for both Bone Health and a healthy immune system.  Discussed Daily exercise and the importance of same, in regards to a healthy heart as well as helping to maintain her weight and improving her mental health.  BMI 23.7. Pap smear is done per ASCCP guidelines.\  -     Liquid-based Pap Smear, Screening    2. Type 1 diabetes mellitus without complication (HCC)  Comments:  Patient is followed at [facility].  Her  last A1c was 6.6.  She has an appointment with them next week.    3. Pre-conception counseling  Comments:  She is currently trying to get pregnant.  She has been off birth control pills for a year.  States that her periods are regular. Folic acid for the prevention of neural tube defects was discussed.  At least 0.4 mg should be taken preconceptionally to reduce the risk.  It was explained that this may reduce the baseline incidence of neural tube defect by as much as 65%.  Folic acid can be found in OTC multivitamins, OTC prenatal vitamins or breakfast cereal that that contains 100% of the RDA of folate. She is encouraged to stop drinking alcohol and to stop smoking if she smokes. Encouraged to eat a healthy diet.     4. Screening examination for STD (sexually transmitted disease)  Comments:  Gonorrhea and Chlamydia are added to Pap smear per screening guidelines.  Orders:  -     Liquid-based Pap Smear, Screening    5. Infertility management  Comments:  Patient's  will have a semen analysis.  She is given a kit today to take home to him.         BMI is within normal parameters. No other follow-up for BMI required.      Anusha Sarkar, APRN  6/16/2022

## 2022-06-17 LAB
C TRACH RRNA CVX QL NAA+PROBE: NOT DETECTED
N GONORRHOEA RRNA SPEC QL NAA+PROBE: NOT DETECTED

## 2022-09-02 ENCOUNTER — TELEPHONE (OUTPATIENT)
Dept: OBSTETRICS AND GYNECOLOGY | Facility: CLINIC | Age: 27
End: 2022-09-02

## 2022-09-02 NOTE — TELEPHONE ENCOUNTER
Pt has new ob appt scheduled for 9/28/22. She is a type 1 DM and wanted to make sure she did not need to come in any sooner. Pt advised to keep appt as scheduled for appropriate dating. She has an endocrinologist in [place] who manages her DM. Pt understands she may be referred to [facility] sooner for DM management during pregnancy. Please advise if you would like anything further.

## 2022-09-28 ENCOUNTER — INITIAL PRENATAL (OUTPATIENT)
Dept: OBSTETRICS AND GYNECOLOGY | Facility: CLINIC | Age: 27
End: 2022-09-28

## 2022-09-28 VITALS — BODY MASS INDEX: 24.63 KG/M2 | WEIGHT: 139 LBS | DIASTOLIC BLOOD PRESSURE: 62 MMHG | SYSTOLIC BLOOD PRESSURE: 120 MMHG

## 2022-09-28 DIAGNOSIS — Z34.01 PRENATAL CARE, FIRST PREGNANCY, FIRST TRIMESTER: Primary | ICD-10-CM

## 2022-09-28 DIAGNOSIS — Z78.9 NON-SMOKER: ICD-10-CM

## 2022-09-28 DIAGNOSIS — O24.019 PRE-EXISTING TYPE 1 DIABETES MELLITUS DURING PREGNANCY, ANTEPARTUM: ICD-10-CM

## 2022-09-28 DIAGNOSIS — E03.9 HYPOTHYROIDISM AFFECTING PREGNANCY, ANTEPARTUM: ICD-10-CM

## 2022-09-28 DIAGNOSIS — O99.280 HYPOTHYROIDISM AFFECTING PREGNANCY, ANTEPARTUM: ICD-10-CM

## 2022-09-28 DIAGNOSIS — R11.0 PREGNANCY RELATED NAUSEA, ANTEPARTUM: ICD-10-CM

## 2022-09-28 DIAGNOSIS — O26.899 PREGNANCY RELATED NAUSEA, ANTEPARTUM: ICD-10-CM

## 2022-09-28 PROBLEM — Z34.00 PRIMIGRAVIDA: Status: ACTIVE | Noted: 2022-09-28

## 2022-09-28 PROCEDURE — 0501F PRENATAL FLOW SHEET: CPT | Performed by: ADVANCED PRACTICE MIDWIFE

## 2022-09-28 RX ORDER — DOCUSATE SODIUM 100 MG/1
100 CAPSULE, LIQUID FILLED ORAL 2 TIMES DAILY
COMMUNITY

## 2022-09-28 RX ORDER — PRENATAL VIT NO.126/IRON/FOLIC 28MG-0.8MG
TABLET ORAL DAILY
COMMUNITY

## 2022-10-03 PROBLEM — O24.111: Status: ACTIVE | Noted: 2022-10-03

## 2022-10-03 PROBLEM — E03.9 HYPOTHYROID IN PREGNANCY, ANTEPARTUM: Status: ACTIVE | Noted: 2022-10-03

## 2022-10-03 PROBLEM — O99.280 HYPOTHYROID IN PREGNANCY, ANTEPARTUM: Status: ACTIVE | Noted: 2022-10-03

## 2022-10-08 LAB
ABO GROUP BLD: NORMAL
BACTERIA UR CULT: NORMAL
BACTERIA UR CULT: NORMAL
BASOPHILS # BLD AUTO: 0.04 10*3/MM3 (ref 0–0.2)
BASOPHILS NFR BLD AUTO: 0.4 % (ref 0–1.5)
BLD GP AB SCN SERPL QL: NEGATIVE
C TRACH RRNA SPEC QL NAA+PROBE: NEGATIVE
DRUGS UR: NORMAL
EOSINOPHIL # BLD AUTO: 0.02 10*3/MM3 (ref 0–0.4)
EOSINOPHIL NFR BLD AUTO: 0.2 % (ref 0.3–6.2)
ERYTHROCYTE [DISTWIDTH] IN BLOOD BY AUTOMATED COUNT: 12.5 % (ref 12.3–15.4)
HBA1C MFR BLD: 6.8 % (ref 4.8–5.6)
HBV SURFACE AG SERPL QL IA: NEGATIVE
HCT VFR BLD AUTO: 39.2 % (ref 34–46.6)
HCV AB S/CO SERPL IA: <0.1 S/CO RATIO (ref 0–0.9)
HGB BLD-MCNC: 12.9 G/DL (ref 12–15.9)
HIV 1+2 AB+HIV1 P24 AG SERPL QL IA: NON REACTIVE
IMM GRANULOCYTES # BLD AUTO: 0.04 10*3/MM3 (ref 0–0.05)
IMM GRANULOCYTES NFR BLD AUTO: 0.4 % (ref 0–0.5)
LYMPHOCYTES # BLD AUTO: 1.39 10*3/MM3 (ref 0.7–3.1)
LYMPHOCYTES NFR BLD AUTO: 14.7 % (ref 19.6–45.3)
MCH RBC QN AUTO: 30.1 PG (ref 26.6–33)
MCHC RBC AUTO-ENTMCNC: 32.9 G/DL (ref 31.5–35.7)
MCV RBC AUTO: 91.4 FL (ref 79–97)
MONOCYTES # BLD AUTO: 0.47 10*3/MM3 (ref 0.1–0.9)
MONOCYTES NFR BLD AUTO: 5 % (ref 5–12)
N GONORRHOEA RRNA SPEC QL NAA+PROBE: NEGATIVE
NEUTROPHILS # BLD AUTO: 7.52 10*3/MM3 (ref 1.7–7)
NEUTROPHILS NFR BLD AUTO: 79.3 % (ref 42.7–76)
NRBC BLD AUTO-RTO: 0 /100 WBC (ref 0–0.2)
PLATELET # BLD AUTO: 269 10*3/MM3 (ref 140–450)
RBC # BLD AUTO: 4.29 10*6/MM3 (ref 3.77–5.28)
RH BLD: POSITIVE
RPR SER QL: NON REACTIVE
RUBV IGG SERPL IA-ACNC: 1.69 INDEX
T4 FREE SERPL-MCNC: 1.72 NG/DL (ref 0.93–1.7)
TSH SERPL DL<=0.005 MIU/L-ACNC: 0.73 UIU/ML (ref 0.27–4.2)
WBC # BLD AUTO: 9.48 10*3/MM3 (ref 3.4–10.8)

## 2022-10-11 DIAGNOSIS — O24.019 PRE-EXISTING TYPE 1 DIABETES MELLITUS DURING PREGNANCY, ANTEPARTUM: Primary | ICD-10-CM

## 2022-10-20 ENCOUNTER — TELEPHONE (OUTPATIENT)
Dept: OBSTETRICS AND GYNECOLOGY | Facility: CLINIC | Age: 27
End: 2022-10-20

## 2022-10-20 NOTE — TELEPHONE ENCOUNTER
Called patient on 10/20/22 to make her aware of her upcoming consult with [facility] Dr Rizvi. This is scheduled for 10/27/2022 at 1:40 pm. Patient is aware.

## 2022-10-27 ENCOUNTER — ROUTINE PRENATAL (OUTPATIENT)
Dept: OBSTETRICS AND GYNECOLOGY | Facility: CLINIC | Age: 27
End: 2022-10-27

## 2022-10-27 VITALS — SYSTOLIC BLOOD PRESSURE: 120 MMHG | DIASTOLIC BLOOD PRESSURE: 72 MMHG | WEIGHT: 142 LBS | BODY MASS INDEX: 25.16 KG/M2

## 2022-10-27 DIAGNOSIS — Z34.91 PRENATAL CARE IN FIRST TRIMESTER: Primary | ICD-10-CM

## 2022-10-27 LAB
GLUCOSE UR STRIP-MCNC: NEGATIVE MG/DL
PROT UR STRIP-MCNC: NEGATIVE MG/DL

## 2022-10-27 PROCEDURE — 0502F SUBSEQUENT PRENATAL CARE: CPT | Performed by: NURSE PRACTITIONER

## 2022-10-27 NOTE — PROGRESS NOTES
Doing well without complaint.  Has MFM appointment this afternoon R/T DX of type 1 diabetes with insulin pump, hypothyroidism, and gastroparesis.  RTO to see us in 4 weeks or sooner as needed.  ER precautions.

## 2022-11-23 ENCOUNTER — ROUTINE PRENATAL (OUTPATIENT)
Dept: OBSTETRICS AND GYNECOLOGY | Facility: CLINIC | Age: 27
End: 2022-11-23

## 2022-11-23 VITALS — DIASTOLIC BLOOD PRESSURE: 64 MMHG | BODY MASS INDEX: 26.58 KG/M2 | SYSTOLIC BLOOD PRESSURE: 120 MMHG | WEIGHT: 150 LBS

## 2022-11-23 DIAGNOSIS — Z34.02 PRENATAL CARE, FIRST PREGNANCY, SECOND TRIMESTER: Primary | ICD-10-CM

## 2022-11-23 DIAGNOSIS — O24.019 PRE-EXISTING TYPE 1 DIABETES MELLITUS DURING PREGNANCY, ANTEPARTUM: ICD-10-CM

## 2022-11-23 DIAGNOSIS — E03.9 HYPOTHYROIDISM AFFECTING PREGNANCY, ANTEPARTUM: ICD-10-CM

## 2022-11-23 DIAGNOSIS — Z78.9 NON-SMOKER: ICD-10-CM

## 2022-11-23 DIAGNOSIS — O99.280 HYPOTHYROIDISM AFFECTING PREGNANCY, ANTEPARTUM: ICD-10-CM

## 2022-11-23 PROBLEM — O24.111: Status: RESOLVED | Noted: 2022-10-03 | Resolved: 2022-11-23

## 2022-11-23 LAB
GLUCOSE UR STRIP-MCNC: NEGATIVE MG/DL
PROT UR STRIP-MCNC: NEGATIVE MG/DL

## 2022-11-23 PROCEDURE — 0502F SUBSEQUENT PRENATAL CARE: CPT | Performed by: ADVANCED PRACTICE MIDWIFE

## 2022-11-23 NOTE — PROGRESS NOTES
"Reason for visit: Routine OB visit at 16w1d     CC:  27-yr old  here for routine OBV at 16w1d.  BRYANNA 2023, by Ultrasound.  Patient reports she is now possibly feeling \"flutters\" fetal movement and denies VB, LOF, contractions, or other concerns. Also denies h/a, visual disturbances, and epigastric pain.     ROS: All systems reviewed and are negative with exception of the following: urine odor    Wt 150 lb for a TWG of 7.258 kg (16 lb), /64, FHTs 150  Urine today and reviewed: negative glucose, negative protein    20-week Anatomy Scan: scheduled for 2022    Exam:  General Appearance:  Healthy appearing . Normal mood and behavior.  HEENT: NCAT, EOMI  HR str and reg. Lungs clear. Resp even and unlabored.  Abdomen is soft and nontender. Bowel sounds active. Uterus is consistent with EGA  Ext: no edema, nontender, no trauma, or cyanosis.    Impression  Diagnoses and all orders for this visit:    1. Prenatal care, first pregnancy, second trimester (Primary)  - Discussed second trimester of pregnancy, discomforts and measures of support, fetal movement, pelvic pain warnings, bleeding warnings, and signs and symptoms to report. Second Trimester of Pregnancy video included in the AVS. Round Ligament Pain education included in the AVS.  - Discussed and encouraged to call or come to the hospital with vaginal bleeding, leaking of fluid, pelvic pain, or any other concerns.  - Return to the office in 4 weeks for a routine OB visit with Dr. Gandara and as needed with concerns.    2. Pre-existing type 1 diabetes mellitus during pregnancy, antepartum  - Has detailed anatomy scan scheduled with [facility] on 2022, whose plan of care also includes fetal echo. Reports some low blood sugars while she is sleeping, and her  or mother wakes her up. Encouraged a HS snack prior to bed. Her next appointment is scheduled for 2022.     3. Hypothyroidism affecting pregnancy, antepartum  - Patient relates " her endocrinologist had increased her synthroid to two tablets daily. Plan for free T4 today. Patient has also requested results for labs to also be forwarded to her endocrinologist.  -     T4, Free    4. Non-smoker          This note has been signed electronically.    Nely Rosales, DNP, APRN, CNM, RNC-OB    Has had some low blood sugars at night while she is sleeping. Encouraged to eat a bedtime. Next appointment with an endocrinologist 12/16/2022. Increased synthroid to two tablets daily at onset of pregnancy

## 2022-11-24 LAB — T4 FREE SERPL-MCNC: 1.42 NG/DL (ref 0.93–1.7)

## 2022-12-16 LAB — HBA1C MFR BLD: 5.8 %

## 2022-12-22 ENCOUNTER — ROUTINE PRENATAL (OUTPATIENT)
Dept: OBSTETRICS AND GYNECOLOGY | Facility: CLINIC | Age: 27
End: 2022-12-22

## 2022-12-22 VITALS — DIASTOLIC BLOOD PRESSURE: 70 MMHG | WEIGHT: 151 LBS | SYSTOLIC BLOOD PRESSURE: 114 MMHG | BODY MASS INDEX: 26.76 KG/M2

## 2022-12-22 DIAGNOSIS — Z78.9 NON-SMOKER: ICD-10-CM

## 2022-12-22 DIAGNOSIS — O99.280 HYPOTHYROIDISM AFFECTING PREGNANCY, ANTEPARTUM: ICD-10-CM

## 2022-12-22 DIAGNOSIS — O24.019 PRE-EXISTING TYPE 1 DIABETES MELLITUS DURING PREGNANCY, ANTEPARTUM: Primary | ICD-10-CM

## 2022-12-22 DIAGNOSIS — E03.9 HYPOTHYROIDISM AFFECTING PREGNANCY, ANTEPARTUM: ICD-10-CM

## 2022-12-22 PROCEDURE — 0502F SUBSEQUENT PRENATAL CARE: CPT | Performed by: OBSTETRICS & GYNECOLOGY

## 2022-12-22 RX ORDER — IBUPROFEN 600 MG/1
TABLET ORAL
COMMUNITY
Start: 2022-12-16

## 2022-12-22 RX ORDER — LEVOTHYROXINE SODIUM 0.1 MG/1
TABLET ORAL
COMMUNITY
Start: 2022-12-18 | End: 2023-01-19 | Stop reason: SDUPTHER

## 2022-12-22 NOTE — PROGRESS NOTES
Patient reports N/V much better now than previous.  Not hurting any, unless she bends over sharply.  No cramping or VB  Still only occasionally feeling FM  Recent Hgb A1C was 5.8.  Patient also seeing Dmitri.  Fetal Echo already scheduled  Patient has already had an eye exam this year.  Baseline 24 hour urine ordered

## 2023-01-10 LAB
ALBUMIN SERPL-MCNC: 4 G/DL (ref 3.5–5.2)
ALBUMIN/GLOB SERPL: 1.7 G/DL
ALP SERPL-CCNC: 50 U/L (ref 39–117)
ALT SERPL-CCNC: 11 U/L (ref 1–33)
AST SERPL-CCNC: 12 U/L (ref 1–32)
BASOPHILS # BLD AUTO: 0.03 10*3/MM3 (ref 0–0.2)
BASOPHILS NFR BLD AUTO: 0.3 % (ref 0–1.5)
BILIRUB SERPL-MCNC: <0.2 MG/DL (ref 0–1.2)
BUN SERPL-MCNC: 11 MG/DL (ref 6–20)
BUN/CREAT SERPL: 19.3 (ref 7–25)
CALCIUM SERPL-MCNC: 9.4 MG/DL (ref 8.6–10.5)
CHLORIDE SERPL-SCNC: 103 MMOL/L (ref 98–107)
CO2 SERPL-SCNC: 25.1 MMOL/L (ref 22–29)
CREAT 24H UR-MRATE: 0.61 G/24 HR (ref 0.7–1.6)
CREAT CL 24H UR+SERPL-VRATE: 74.8 ML/MIN (ref 74.3–113.9)
CREAT SERPL-MCNC: 0.57 MG/DL (ref 0.57–1)
CREAT UR-MCNC: 27.3 MG/DL
EGFRCR SERPLBLD CKD-EPI 2021: 127.9 ML/MIN/1.73
EOSINOPHIL # BLD AUTO: 0.03 10*3/MM3 (ref 0–0.4)
EOSINOPHIL NFR BLD AUTO: 0.3 % (ref 0.3–6.2)
ERYTHROCYTE [DISTWIDTH] IN BLOOD BY AUTOMATED COUNT: 11.8 % (ref 12.3–15.4)
GLOBULIN SER CALC-MCNC: 2.3 GM/DL
GLUCOSE SERPL-MCNC: 91 MG/DL (ref 65–99)
HCT VFR BLD AUTO: 33.8 % (ref 34–46.6)
HGB BLD-MCNC: 12 G/DL (ref 12–15.9)
IMM GRANULOCYTES # BLD AUTO: 0.11 10*3/MM3 (ref 0–0.05)
IMM GRANULOCYTES NFR BLD AUTO: 1 % (ref 0–0.5)
LYMPHOCYTES # BLD AUTO: 0.89 10*3/MM3 (ref 0.7–3.1)
LYMPHOCYTES NFR BLD AUTO: 8 % (ref 19.6–45.3)
MCH RBC QN AUTO: 30.2 PG (ref 26.6–33)
MCHC RBC AUTO-ENTMCNC: 35.5 G/DL (ref 31.5–35.7)
MCV RBC AUTO: 85.1 FL (ref 79–97)
MONOCYTES # BLD AUTO: 0.49 10*3/MM3 (ref 0.1–0.9)
MONOCYTES NFR BLD AUTO: 4.4 % (ref 5–12)
NEUTROPHILS # BLD AUTO: 9.56 10*3/MM3 (ref 1.7–7)
NEUTROPHILS NFR BLD AUTO: 86 % (ref 42.7–76)
NRBC BLD AUTO-RTO: 0 /100 WBC (ref 0–0.2)
PLATELET # BLD AUTO: 250 10*3/MM3 (ref 140–450)
POTASSIUM SERPL-SCNC: 4.1 MMOL/L (ref 3.5–5.2)
PROT 24H UR-MRATE: 121.5 MG/24HOURS (ref 0–150)
PROT SERPL-MCNC: 6.3 G/DL (ref 6–8.5)
PROT UR-MCNC: 5.4 MG/DL
RBC # BLD AUTO: 3.97 10*6/MM3 (ref 3.77–5.28)
SODIUM SERPL-SCNC: 137 MMOL/L (ref 136–145)
WBC # BLD AUTO: 11.11 10*3/MM3 (ref 3.4–10.8)

## 2023-01-19 ENCOUNTER — ROUTINE PRENATAL (OUTPATIENT)
Dept: OBSTETRICS AND GYNECOLOGY | Facility: CLINIC | Age: 28
End: 2023-01-19
Payer: COMMERCIAL

## 2023-01-19 VITALS — WEIGHT: 158 LBS | DIASTOLIC BLOOD PRESSURE: 62 MMHG | BODY MASS INDEX: 28 KG/M2 | SYSTOLIC BLOOD PRESSURE: 116 MMHG

## 2023-01-19 DIAGNOSIS — O99.280 HYPOTHYROIDISM AFFECTING PREGNANCY, ANTEPARTUM: ICD-10-CM

## 2023-01-19 DIAGNOSIS — Z34.02 PRENATAL CARE, FIRST PREGNANCY, SECOND TRIMESTER: Primary | ICD-10-CM

## 2023-01-19 DIAGNOSIS — E03.9 HYPOTHYROIDISM AFFECTING PREGNANCY, ANTEPARTUM: ICD-10-CM

## 2023-01-19 DIAGNOSIS — O24.019 PRE-EXISTING TYPE 1 DIABETES MELLITUS DURING PREGNANCY, ANTEPARTUM: ICD-10-CM

## 2023-01-19 DIAGNOSIS — Z78.9 NON-SMOKER: ICD-10-CM

## 2023-01-19 LAB
GLUCOSE UR STRIP-MCNC: NEGATIVE MG/DL
PROT UR STRIP-MCNC: NEGATIVE MG/DL

## 2023-01-19 PROCEDURE — 0502F SUBSEQUENT PRENATAL CARE: CPT | Performed by: ADVANCED PRACTICE MIDWIFE

## 2023-01-19 RX ORDER — ASCORBIC ACID, CHOLECALCIFEROL, .ALPHA.-TOCOPHEROL ACETATE, DL-, PYRIDOXINE, FOLIC ACID, CYANOCOBALAMIN, CALCIUM, FERROUS FUMARATE, MAGNESIUM, DOCONEXENT 85; 200; 10; 25; 1; 12; 140; 27; 45; 300 [IU]/1; [IU]/1; [IU]/1; [IU]/1; MG/1; UG/1; MG/1; MG/1; MG/1; MG/1
CAPSULE, GELATIN COATED ORAL
COMMUNITY
End: 2023-01-19 | Stop reason: SDUPTHER

## 2023-01-19 RX ORDER — LEVOTHYROXINE SODIUM 0.1 MG/1
100 TABLET ORAL DAILY
COMMUNITY
Start: 2022-12-30 | End: 2023-03-08 | Stop reason: DRUGHIGH

## 2023-01-19 RX ORDER — INSULIN LISPRO 100 [IU]/ML
INJECTION, SOLUTION INTRAVENOUS; SUBCUTANEOUS
COMMUNITY
Start: 2023-01-12 | End: 2023-01-19 | Stop reason: SDUPTHER

## 2023-01-19 RX ORDER — PROCHLORPERAZINE 25 MG/1
SUPPOSITORY RECTAL
COMMUNITY
Start: 2022-12-30

## 2023-01-19 NOTE — PROGRESS NOTES
Reason for visit: Routine OB visit at 24w2d     CC:  27-yr old  here for routine OBV at 24w2d.  BRYANNA 2023, by Ultrasound.  Patient reports feeling more fetal movement than before and denies VB, LOF, contractions, or other concerns. Also denies h/a, visual disturbances, and epigastric pain.     ROS: All systems reviewed and are negative.    Wt 158 lb for a TWG of 10.9 kg (24 lb), /62, FHTs 156, FH 22 cm  Urine today and reviewed: negative glucose, negative protein    22-week (2023) Anatomy Scan: normal; interval growth appropriate; anterior placenta without evidence of placenta previa    Exam:  General Appearance:  Healthy appearing . Normal mood and behavior.  HEENT: NCAT, EOMI  HR str and reg. Lungs clear. Resp even and unlabored.  Abdomen is soft and nontender. Bowel sounds active. Uterus is consistent with EGA  Ext: no edema, nontender, no trauma, or cyanosis.    Impression  Diagnoses and all orders for this visit:    1. Prenatal care, first pregnancy, second trimester (Primary)  - Discussed second trimester of pregnancy, discomforts and measures of support, fetal movement,  labor warnings, preeclampsia warnings, and signs and symptoms to report. Second Trimester of Pregnancy video and Round Ligament Pain education included in the AVS.  - Discussed plan for hemoglobin for the next visit.  - Discussed and encouraged to call or come to the hospital for vaginal bleeding, leaking of fluid, contractions, or any other concerns.  - Return to the office in four weeks with Dr. Gandara with a 4D ultrasound and as needed with concerns.    2. Pre-existing type 1 diabetes mellitus during pregnancy, antepartum  - She relates her blood sugars are elevated if she eats over 30 carbs. Last hemoglobin A1c 5.8%. Baseline 24 hour urine labs reviewed normal. Sees Dr. Rizvi also.    3. Hypothyroidism affecting pregnancy, antepartum  - Continues with Levothyroxine 100 mcg daily. Last Free t4 1.12  ng/dL and TSH 0.503 mcunit/mL.    4. Non-smoker          This note has been signed electronically.    Nely Rosales, DNP, APRN, CNM, RNC-OB

## 2023-02-17 ENCOUNTER — ROUTINE PRENATAL (OUTPATIENT)
Dept: OBSTETRICS AND GYNECOLOGY | Facility: CLINIC | Age: 28
End: 2023-02-17
Payer: COMMERCIAL

## 2023-02-17 VITALS — WEIGHT: 159 LBS | DIASTOLIC BLOOD PRESSURE: 62 MMHG | BODY MASS INDEX: 28.17 KG/M2 | SYSTOLIC BLOOD PRESSURE: 104 MMHG

## 2023-02-17 DIAGNOSIS — O99.280 HYPOTHYROIDISM AFFECTING PREGNANCY, ANTEPARTUM: ICD-10-CM

## 2023-02-17 DIAGNOSIS — Z78.9 NON-SMOKER: ICD-10-CM

## 2023-02-17 DIAGNOSIS — E03.9 HYPOTHYROIDISM AFFECTING PREGNANCY, ANTEPARTUM: ICD-10-CM

## 2023-02-17 DIAGNOSIS — Z34.03 PRENATAL CARE, FIRST PREGNANCY, THIRD TRIMESTER: Primary | ICD-10-CM

## 2023-02-17 DIAGNOSIS — Z23 NEED FOR TDAP VACCINATION: ICD-10-CM

## 2023-02-17 DIAGNOSIS — O24.019 PRE-EXISTING TYPE 1 DIABETES MELLITUS DURING PREGNANCY, ANTEPARTUM: ICD-10-CM

## 2023-02-17 LAB
GLUCOSE UR STRIP-MCNC: NEGATIVE MG/DL
PROT UR STRIP-MCNC: NEGATIVE MG/DL

## 2023-02-17 PROCEDURE — 90715 TDAP VACCINE 7 YRS/> IM: CPT | Performed by: ADVANCED PRACTICE MIDWIFE

## 2023-02-17 PROCEDURE — 90471 IMMUNIZATION ADMIN: CPT | Performed by: ADVANCED PRACTICE MIDWIFE

## 2023-02-17 PROCEDURE — 0502F SUBSEQUENT PRENATAL CARE: CPT | Performed by: ADVANCED PRACTICE MIDWIFE

## 2023-02-17 RX ORDER — INSULIN PMP CART,AUT,G6/7,CNTR
EACH SUBCUTANEOUS
COMMUNITY

## 2023-02-17 NOTE — PROGRESS NOTES
Reason for visit: Routine OB visit at 28w3d     CC:  27-yr old  here for routine OBV at 28w3d.  BRYANNA 2023, by Ultrasound.  Patient reports good fetal movement and denies VB, LOF, or contractions. Also denies h/a and epigastric pain. She has noted some episodes of blurred vision, which she relates to the lower blood sugars. She knows she is also due for her routine eye examination.     ROS: All systems reviewed and are negative, except for the following: visual disurbances (blurry vision episodes)    Wt 159 lb for a TWG of 11.3 kg (25 lb), /62, FHTs 137, FH 26 cm  Urine today and reviewed: negative glucose, negative protein    26-week (2023) U/S: EFW 1084 g, 82%; IZA 14.93 cm, DVP 5.19 cm; interval growth mildly accelerated; anterior placenta with no evidence of previa  22-week (2023) Anatomy Scan: normal; interval growth appropriate; anterior placenta without evidence of placenta previa    Exam:  General Appearance:  Healthy appearing . Normal mood and behavior.  HEENT: NCAT, EOMI  HR str and reg. Lungs clear. Resp even and unlabored.  Abdomen is soft and nontender. Uterus is consistent with EGA  Ext: no edema, nontender, no trauma, or cyanosis.    Impression  Diagnoses and all orders for this visit:    1. Prenatal care, first pregnancy, third trimester (Primary)  - Discussed third trimester of pregnancy, discomforts and measures of support, fetal movement counts,  labor warnings, preeclampsia warnings, and signs and symptoms to report. Third Trimester of Pregnancy and Alternative Pain Management During Labor and Delivery videos included in the AVS.  - Discussed and encouraged to come to the hospital or call with vaginal bleeding, leaking of fluid, contractions, or other concerns.  - Return to the office in two weeks for routine OBV with Dr. Gandara and as needed with concerns.    2. Pre-existing type 1 diabetes mellitus during pregnancy, antepartum  - Feels as though she is  having more blood sugar lows, as she did in early pregnancy. She just had an insulin change and feels it may be related to this. Reports a 5.80% in December with endocrinologist at [place]. She returns to see her endocrinologist in early March.     3. Hypothyroidism affecting pregnancy, antepartum  - Managed with Synthroid 100 mcg daily. Last TSH normal 0.503 mcunit/mL on 12/16/2022. Will have repeat in early March with endocrinologist.    4. Need for Tdap vaccination  - Discussed Tdap immunization recommendations during pregnancy. Patient consents to receive the Tdap immunization during this clinic visit. Tdap (Tetanus, Diptheria, Pertussis) Vaccine: What You Need to Know (VIS) education included in the AVS.  -     Tdap Vaccine Greater Than or Equal To 6yo IM    5. Non-smoker          This note has been signed electronically.    Nely Rosales, DNP, APRN, CNM, RNC-OB

## 2023-03-01 ENCOUNTER — ROUTINE PRENATAL (OUTPATIENT)
Dept: OBSTETRICS AND GYNECOLOGY | Facility: CLINIC | Age: 28
End: 2023-03-01
Payer: COMMERCIAL

## 2023-03-01 ENCOUNTER — LAB (OUTPATIENT)
Dept: LAB | Facility: HOSPITAL | Age: 28
End: 2023-03-01
Payer: COMMERCIAL

## 2023-03-01 VITALS — DIASTOLIC BLOOD PRESSURE: 80 MMHG | SYSTOLIC BLOOD PRESSURE: 138 MMHG | WEIGHT: 159 LBS | BODY MASS INDEX: 28.17 KG/M2

## 2023-03-01 DIAGNOSIS — E03.9 HYPOTHYROIDISM AFFECTING PREGNANCY, ANTEPARTUM: ICD-10-CM

## 2023-03-01 DIAGNOSIS — O99.280 HYPOTHYROIDISM AFFECTING PREGNANCY, ANTEPARTUM: ICD-10-CM

## 2023-03-01 DIAGNOSIS — O24.019 PRE-EXISTING TYPE 1 DIABETES MELLITUS DURING PREGNANCY, ANTEPARTUM: ICD-10-CM

## 2023-03-01 DIAGNOSIS — Z34.03 PRENATAL CARE, FIRST PREGNANCY, THIRD TRIMESTER: Primary | ICD-10-CM

## 2023-03-01 LAB
GLUCOSE UR STRIP-MCNC: NEGATIVE MG/DL
PROT UR STRIP-MCNC: NEGATIVE MG/DL
TSH SERPL DL<=0.05 MIU/L-ACNC: 0.26 UIU/ML (ref 0.27–4.2)

## 2023-03-01 PROCEDURE — 36415 COLL VENOUS BLD VENIPUNCTURE: CPT | Performed by: OBSTETRICS & GYNECOLOGY

## 2023-03-01 PROCEDURE — 0502F SUBSEQUENT PRENATAL CARE: CPT | Performed by: OBSTETRICS & GYNECOLOGY

## 2023-03-01 PROCEDURE — 84443 ASSAY THYROID STIM HORMONE: CPT | Performed by: OBSTETRICS & GYNECOLOGY

## 2023-03-01 NOTE — PROGRESS NOTES
"Patient reports occasional pain, but it is \"just random\"  No contractions.  No LOF or VB.  Good FM  Patient reports fsbs have been \"high but stable\".  She just saw Dr. Rizvi two days ago and they increased her basal rate on her pump.  Patient reports occasional LE swelling, but will wear compression socks when it occurs.  Questions about L&D answered  "

## 2023-03-06 NOTE — PROGRESS NOTES
Patient needs her Synthroid adjusted.  Please contact the patient to see who is managing her hypothyroidism during pregnancy.  I am happy to adjust it, but do not want to do so if there is someone else managing this problem.  Also, the patient needs to begin twice weekly BPP's at 32 weeks; please pass this on to scheduling so that they can schedule her for the extra visits.  Thanks

## 2023-03-07 NOTE — PROGRESS NOTES
So, are we faxing new labs to her usual thyroid doctor, or does she want me to decrease the dose of her synthroid?

## 2023-03-08 DIAGNOSIS — E03.9 HYPOTHYROID IN PREGNANCY, ANTEPARTUM: Primary | ICD-10-CM

## 2023-03-08 DIAGNOSIS — O99.280 HYPOTHYROID IN PREGNANCY, ANTEPARTUM: Primary | ICD-10-CM

## 2023-03-08 RX ORDER — LEVOTHYROXINE SODIUM 0.07 MG/1
75 TABLET ORAL DAILY
Qty: 30 TABLET | Refills: 1 | Status: SHIPPED | OUTPATIENT
Start: 2023-03-08

## 2023-03-09 ENCOUNTER — TELEPHONE (OUTPATIENT)
Dept: OBSTETRICS AND GYNECOLOGY | Facility: CLINIC | Age: 28
End: 2023-03-09
Payer: COMMERCIAL

## 2023-03-09 NOTE — TELEPHONE ENCOUNTER
Attempted to reach pt to let her know that Dr. Gandara did make adjustments to her synthroid and sent it to her [facility] pharmacy in [place]. Synthroid is now 75MCG to be taken once daily. Left message for pt to return call to office. BS

## 2023-03-14 ENCOUNTER — ROUTINE PRENATAL (OUTPATIENT)
Dept: OBSTETRICS AND GYNECOLOGY | Facility: CLINIC | Age: 28
End: 2023-03-14
Payer: COMMERCIAL

## 2023-03-14 VITALS — WEIGHT: 158 LBS | BODY MASS INDEX: 28 KG/M2 | DIASTOLIC BLOOD PRESSURE: 68 MMHG | SYSTOLIC BLOOD PRESSURE: 106 MMHG

## 2023-03-14 DIAGNOSIS — O99.280 HYPOTHYROID IN PREGNANCY, ANTEPARTUM: ICD-10-CM

## 2023-03-14 DIAGNOSIS — Z34.03 PRIMIGRAVIDA, THIRD TRIMESTER: Primary | ICD-10-CM

## 2023-03-14 DIAGNOSIS — Z3A.32 32 WEEKS GESTATION OF PREGNANCY: ICD-10-CM

## 2023-03-14 DIAGNOSIS — O24.019 PRE-EXISTING TYPE 1 DIABETES MELLITUS DURING PREGNANCY, ANTEPARTUM: ICD-10-CM

## 2023-03-14 DIAGNOSIS — Z78.9 NON-SMOKER: ICD-10-CM

## 2023-03-14 DIAGNOSIS — E03.9 HYPOTHYROID IN PREGNANCY, ANTEPARTUM: ICD-10-CM

## 2023-03-14 LAB
GLUCOSE UR STRIP-MCNC: NEGATIVE MG/DL
PROT UR STRIP-MCNC: NEGATIVE MG/DL

## 2023-03-14 PROCEDURE — 0502F SUBSEQUENT PRENATAL CARE: CPT | Performed by: ADVANCED PRACTICE MIDWIFE

## 2023-03-14 NOTE — PROGRESS NOTES
Reason for visit: Routine OB visit at 32w0d     CC:  27-yr old  here for routine OBV at 32w0d.  BRYANNA 2023, by Ultrasound.  Patient reports good fetal movement and denies VB, LOF, contractions, or other concerns. Also denies h/a, visual disturbances, and epigastric pain.     ROS: All systems reviewed and are negative with exception of the following: fatigue    Wt 158 lb for a TWG of 10.9 kg (24 lb), /68, FHTs 153, FH 32 cm  Urine today and reviewed: negative glucose, negative protein    32-week (2023) U/S: BPP 8/8; IZA 14.62 cm, DVP 4.75 cm; vertex presentation; anterior placenta  29-week (2023) Anatomy Scan: EFW 1612 g, 62%; IZA 14.34 cm, DVP 4.57 cm; interval growth appropriate; anterior placenta without evidence of placenta previa    Exam:  General Appearance:  Healthy appearing . Normal mood and behavior.  HEENT: NCAT, EOMI  HR str and reg. Lungs clear. Resp even and unlabored.  Abdomen is soft and nontender. Bowel sounds active. Uterus is consistent with EGA  Ext: no edema, nontender, no trauma, or cyanosis.    Impression  Diagnoses and all orders for this visit:    1. Primigravida, third trimester (Primary)  - Discussed third trimester of pregnancy, discomforts and measures of support, fetal movement counts,  labor warnings, preeclampsia warnings, and signs and symptoms to report. Third Trimester of Pregnancy and Alternative Management During Labor and Delivery videos included in the AVS.  - Discussed and encouraged to come to the hospital or call with vaginal bleeding, leaking of fluid, contractions, or other concerns.  - Return to the office Friday with Dr. Gandara with a BPP for routine OBV and as needed with concerns.    2. 32 weeks gestation of pregnancy  - Discussed and encouraged childbirth education classes. She relates she has spoken with the Maternal-Child Department, and they are trying to work out education. She has also been looking at short videos through  [employer]. She plans to breastfeed and has a pump. She has the initial OB folder, which contains handouts with outpatient lactation contact information and videos for lactation support.     3. Pre-existing type 1 diabetes mellitus during pregnancy, antepartum  - Relates her blood sugars have been elevated as of late, with as high as 180's. She is beginning twice weekly testing. Last interval growth at 29-weeks, which showed appropriate interval growth. Hemoglobin A1c 5.8% on 12/16/2022    4. Hypothyroid in pregnancy, antepartum  - TSH low at 0.258 uIU/mL on 03/01/2023. Synthroid decreased to 75 mcg daily on 03/08/2023. Will follow-up with TSH at an upcoming appointment.     5. Non-smoker          This note has been signed electronically.    Nely Rosales, DNP, APRN, CNM, RNC-OB

## 2023-03-16 ENCOUNTER — ROUTINE PRENATAL (OUTPATIENT)
Dept: OBSTETRICS AND GYNECOLOGY | Facility: CLINIC | Age: 28
End: 2023-03-16
Payer: COMMERCIAL

## 2023-03-16 VITALS — WEIGHT: 160 LBS | BODY MASS INDEX: 28.35 KG/M2 | DIASTOLIC BLOOD PRESSURE: 68 MMHG | SYSTOLIC BLOOD PRESSURE: 120 MMHG

## 2023-03-16 DIAGNOSIS — Z34.03 PRIMIGRAVIDA, THIRD TRIMESTER: Primary | ICD-10-CM

## 2023-03-16 DIAGNOSIS — Z78.9 NON-SMOKER: ICD-10-CM

## 2023-03-16 DIAGNOSIS — O99.280 HYPOTHYROID IN PREGNANCY, ANTEPARTUM: ICD-10-CM

## 2023-03-16 DIAGNOSIS — E03.9 HYPOTHYROID IN PREGNANCY, ANTEPARTUM: ICD-10-CM

## 2023-03-16 DIAGNOSIS — O24.019 PRE-EXISTING TYPE 1 DIABETES MELLITUS DURING PREGNANCY, ANTEPARTUM: ICD-10-CM

## 2023-03-16 LAB
GLUCOSE UR STRIP-MCNC: ABNORMAL MG/DL
PROT UR STRIP-MCNC: NEGATIVE MG/DL

## 2023-03-16 PROCEDURE — 0502F SUBSEQUENT PRENATAL CARE: CPT | Performed by: OBSTETRICS & GYNECOLOGY

## 2023-03-16 RX ORDER — INSULIN LISPRO 100 [IU]/ML
INJECTION, SOLUTION INTRAVENOUS; SUBCUTANEOUS
COMMUNITY
Start: 2023-03-02 | End: 2023-03-30

## 2023-03-16 RX ORDER — INSULIN PMP CART,AUT,G6/7,CNTR
1 EACH SUBCUTANEOUS
COMMUNITY
Start: 2023-03-02 | End: 2023-03-30

## 2023-03-16 RX ORDER — INSULIN LISPRO 100 [IU]/ML
INJECTION, SOLUTION INTRAVENOUS; SUBCUTANEOUS
COMMUNITY
Start: 2023-03-02

## 2023-03-16 RX ORDER — INSULIN PMP CART,AUT,G6/7,CNTR
EACH SUBCUTANEOUS
COMMUNITY
Start: 2023-03-03 | End: 2023-03-30

## 2023-03-16 NOTE — PROGRESS NOTES
Patient not having any pain, except discomfort in region of her bladder which only happens when she walks really fast.   No LOF or VB  fsbs have been an average of 138 for past 7 days according to her Dexcom.  Patient seeing Dr. Rizvi next Monday.  BPP today 8/8, IZA 15  Labor questions answered.  TSH was very low when checked at last visit, so synthroid was decreased from 100 to 75 mcg, but that has only been a little over a week.  Will check TSH again in two weeks.

## 2023-03-24 ENCOUNTER — ROUTINE PRENATAL (OUTPATIENT)
Dept: OBSTETRICS AND GYNECOLOGY | Facility: CLINIC | Age: 28
End: 2023-03-24
Payer: COMMERCIAL

## 2023-03-24 VITALS — BODY MASS INDEX: 28 KG/M2 | WEIGHT: 158 LBS | DIASTOLIC BLOOD PRESSURE: 62 MMHG | SYSTOLIC BLOOD PRESSURE: 122 MMHG

## 2023-03-24 DIAGNOSIS — Z78.9 NON-SMOKER: ICD-10-CM

## 2023-03-24 DIAGNOSIS — E03.9 HYPOTHYROID IN PREGNANCY, ANTEPARTUM: ICD-10-CM

## 2023-03-24 DIAGNOSIS — O99.280 HYPOTHYROID IN PREGNANCY, ANTEPARTUM: ICD-10-CM

## 2023-03-24 DIAGNOSIS — Z34.03 PRIMIGRAVIDA, THIRD TRIMESTER: Primary | ICD-10-CM

## 2023-03-24 DIAGNOSIS — O24.019 PRE-EXISTING TYPE 1 DIABETES MELLITUS DURING PREGNANCY, ANTEPARTUM: ICD-10-CM

## 2023-03-24 LAB
GLUCOSE UR STRIP-MCNC: NEGATIVE MG/DL
PROT UR STRIP-MCNC: NEGATIVE MG/DL

## 2023-03-24 NOTE — PROGRESS NOTES
"Not hurting any.  No LOF or VB  Good FM most of the time, but patient reports less movement the past few days since she has been \"super constipated\".  Patient taking MOM for constipation, but says it is not effective.  Advised that Miralax is ok to take in pregnancy, but she says that it gives her nausea.  BPP today 8/8  Patient reports that she has been having too many low fsbs's; insulin is being adjusted at [facility] office.  "

## 2023-03-30 ENCOUNTER — ROUTINE PRENATAL (OUTPATIENT)
Dept: OBSTETRICS AND GYNECOLOGY | Facility: CLINIC | Age: 28
End: 2023-03-30
Payer: COMMERCIAL

## 2023-03-30 VITALS — WEIGHT: 162 LBS | BODY MASS INDEX: 28.7 KG/M2 | SYSTOLIC BLOOD PRESSURE: 120 MMHG | DIASTOLIC BLOOD PRESSURE: 72 MMHG

## 2023-03-30 DIAGNOSIS — O24.019 PRE-EXISTING TYPE 1 DIABETES MELLITUS DURING PREGNANCY, ANTEPARTUM: ICD-10-CM

## 2023-03-30 DIAGNOSIS — Z78.9 NON-SMOKER: ICD-10-CM

## 2023-03-30 DIAGNOSIS — O99.280 HYPOTHYROID IN PREGNANCY, ANTEPARTUM: ICD-10-CM

## 2023-03-30 DIAGNOSIS — E03.9 HYPOTHYROID IN PREGNANCY, ANTEPARTUM: ICD-10-CM

## 2023-03-30 DIAGNOSIS — Z34.03 PRIMIGRAVIDA, THIRD TRIMESTER: Primary | ICD-10-CM

## 2023-03-30 LAB
GLUCOSE UR STRIP-MCNC: NEGATIVE MG/DL
PROT UR STRIP-MCNC: NEGATIVE MG/DL

## 2023-03-30 NOTE — PROGRESS NOTES
Dmitri has recommended delivery at 38 weeks.  Will plan for IOL that week unless recommendations change at her next [facility] evaluation  Patient reports fsbs have been more stable this week.  BPP today 8/8.  IZA 15.4

## 2023-04-06 ENCOUNTER — ROUTINE PRENATAL (OUTPATIENT)
Dept: OBSTETRICS AND GYNECOLOGY | Facility: CLINIC | Age: 28
End: 2023-04-06
Payer: COMMERCIAL

## 2023-04-06 VITALS — SYSTOLIC BLOOD PRESSURE: 112 MMHG | BODY MASS INDEX: 28 KG/M2 | DIASTOLIC BLOOD PRESSURE: 74 MMHG | WEIGHT: 158 LBS

## 2023-04-06 DIAGNOSIS — O24.019 PRE-EXISTING TYPE 1 DIABETES MELLITUS DURING PREGNANCY, ANTEPARTUM: Primary | ICD-10-CM

## 2023-04-06 LAB
GLUCOSE UR STRIP-MCNC: NEGATIVE MG/DL
PROT UR STRIP-MCNC: NEGATIVE MG/DL

## 2023-04-06 PROCEDURE — 87491 CHLMYD TRACH DNA AMP PROBE: CPT | Performed by: OBSTETRICS & GYNECOLOGY

## 2023-04-06 PROCEDURE — 87661 TRICHOMONAS VAGINALIS AMPLIF: CPT | Performed by: OBSTETRICS & GYNECOLOGY

## 2023-04-06 PROCEDURE — 87081 CULTURE SCREEN ONLY: CPT | Performed by: OBSTETRICS & GYNECOLOGY

## 2023-04-06 PROCEDURE — 87591 N.GONORRHOEAE DNA AMP PROB: CPT | Performed by: OBSTETRICS & GYNECOLOGY

## 2023-04-06 PROCEDURE — 0502F SUBSEQUENT PRENATAL CARE: CPT | Performed by: OBSTETRICS & GYNECOLOGY

## 2023-04-06 NOTE — PROGRESS NOTES
Good fetal movement  Planning induction at 38 weeks  BPP 8/8, IZA 18cm  Cervix moderate, posterior  GBS and GC/Chl ordered and done  Labor instructions    Diagnoses and all orders for this visit:    1. Pre-existing type 1 diabetes mellitus during pregnancy, antepartum (Primary)

## 2023-04-07 LAB
C TRACH RRNA CVX QL NAA+PROBE: NOT DETECTED
N GONORRHOEA RRNA SPEC QL NAA+PROBE: NOT DETECTED
TRICHOMONAS VAGINALIS PCR: NOT DETECTED

## 2023-04-09 LAB — BACTERIA SPEC AEROBE CULT: NORMAL

## 2023-04-10 ENCOUNTER — LAB (OUTPATIENT)
Dept: LAB | Facility: HOSPITAL | Age: 28
End: 2023-04-10
Payer: COMMERCIAL

## 2023-04-10 ENCOUNTER — TRANSCRIBE ORDERS (OUTPATIENT)
Dept: LAB | Facility: HOSPITAL | Age: 28
End: 2023-04-10
Payer: COMMERCIAL

## 2023-04-10 DIAGNOSIS — O24.019 TYPE 1 DIABETES MELLITUS COMPLICATING PREGNANCY, ANTEPARTUM, UNSPECIFIED TRIMESTER: ICD-10-CM

## 2023-04-10 DIAGNOSIS — O24.019 TYPE 1 DIABETES MELLITUS COMPLICATING PREGNANCY, ANTEPARTUM, UNSPECIFIED TRIMESTER: Primary | ICD-10-CM

## 2023-04-10 LAB
ALBUMIN SERPL-MCNC: 3.6 G/DL (ref 3.5–5.2)
ALBUMIN/GLOB SERPL: 1.3 G/DL
ALP SERPL-CCNC: 87 U/L (ref 39–117)
ALT SERPL W P-5'-P-CCNC: 12 U/L (ref 1–33)
ANION GAP SERPL CALCULATED.3IONS-SCNC: 10 MMOL/L (ref 5–15)
AST SERPL-CCNC: 17 U/L (ref 1–32)
BASOPHILS # BLD AUTO: 0.03 10*3/MM3 (ref 0–0.2)
BASOPHILS NFR BLD AUTO: 0.3 % (ref 0–1.5)
BILIRUB SERPL-MCNC: 0.3 MG/DL (ref 0–1.2)
BUN SERPL-MCNC: 14 MG/DL (ref 6–20)
BUN/CREAT SERPL: 29.2 (ref 7–25)
CALCIUM SPEC-SCNC: 9 MG/DL (ref 8.6–10.5)
CHLORIDE SERPL-SCNC: 104 MMOL/L (ref 98–107)
CO2 SERPL-SCNC: 24 MMOL/L (ref 22–29)
CREAT SERPL-MCNC: 0.48 MG/DL (ref 0.57–1)
CREAT UR-MCNC: 15.7 MG/DL
DEPRECATED RDW RBC AUTO: 38.8 FL (ref 37–54)
EGFRCR SERPLBLD CKD-EPI 2021: 132.5 ML/MIN/1.73
EOSINOPHIL # BLD AUTO: 0.06 10*3/MM3 (ref 0–0.4)
EOSINOPHIL NFR BLD AUTO: 0.6 % (ref 0.3–6.2)
ERYTHROCYTE [DISTWIDTH] IN BLOOD BY AUTOMATED COUNT: 12.2 % (ref 12.3–15.4)
GLOBULIN UR ELPH-MCNC: 2.8 GM/DL
GLUCOSE SERPL-MCNC: 46 MG/DL (ref 65–99)
HCT VFR BLD AUTO: 37.3 % (ref 34–46.6)
HGB BLD-MCNC: 11.9 G/DL (ref 12–15.9)
IMM GRANULOCYTES # BLD AUTO: 0.09 10*3/MM3 (ref 0–0.05)
IMM GRANULOCYTES NFR BLD AUTO: 1 % (ref 0–0.5)
LYMPHOCYTES # BLD AUTO: 1.48 10*3/MM3 (ref 0.7–3.1)
LYMPHOCYTES NFR BLD AUTO: 15.7 % (ref 19.6–45.3)
MCH RBC QN AUTO: 28 PG (ref 26.6–33)
MCHC RBC AUTO-ENTMCNC: 31.9 G/DL (ref 31.5–35.7)
MCV RBC AUTO: 87.8 FL (ref 79–97)
MONOCYTES # BLD AUTO: 0.66 10*3/MM3 (ref 0.1–0.9)
MONOCYTES NFR BLD AUTO: 7 % (ref 5–12)
NEUTROPHILS NFR BLD AUTO: 7.1 10*3/MM3 (ref 1.7–7)
NEUTROPHILS NFR BLD AUTO: 75.4 % (ref 42.7–76)
NRBC BLD AUTO-RTO: 0 /100 WBC (ref 0–0.2)
PLATELET # BLD AUTO: 223 10*3/MM3 (ref 140–450)
PMV BLD AUTO: 11.7 FL (ref 6–12)
POTASSIUM SERPL-SCNC: 3.5 MMOL/L (ref 3.5–5.2)
PROT ?TM UR-MCNC: <4 MG/DL
PROT SERPL-MCNC: 6.4 G/DL (ref 6–8.5)
RBC # BLD AUTO: 4.25 10*6/MM3 (ref 3.77–5.28)
SODIUM SERPL-SCNC: 138 MMOL/L (ref 136–145)
WBC NRBC COR # BLD: 9.42 10*3/MM3 (ref 3.4–10.8)

## 2023-04-10 PROCEDURE — 80053 COMPREHEN METABOLIC PANEL: CPT

## 2023-04-10 PROCEDURE — 82570 ASSAY OF URINE CREATININE: CPT

## 2023-04-10 PROCEDURE — 85025 COMPLETE CBC W/AUTO DIFF WBC: CPT

## 2023-04-10 PROCEDURE — 36415 COLL VENOUS BLD VENIPUNCTURE: CPT

## 2023-04-10 PROCEDURE — 84156 ASSAY OF PROTEIN URINE: CPT

## 2023-04-13 ENCOUNTER — ROUTINE PRENATAL (OUTPATIENT)
Dept: OBSTETRICS AND GYNECOLOGY | Facility: CLINIC | Age: 28
End: 2023-04-13
Payer: COMMERCIAL

## 2023-04-13 VITALS — DIASTOLIC BLOOD PRESSURE: 80 MMHG | SYSTOLIC BLOOD PRESSURE: 124 MMHG | BODY MASS INDEX: 29.06 KG/M2 | WEIGHT: 164 LBS

## 2023-04-13 DIAGNOSIS — Z34.03 PRIMIGRAVIDA, THIRD TRIMESTER: ICD-10-CM

## 2023-04-13 DIAGNOSIS — O99.280 HYPOTHYROID IN PREGNANCY, ANTEPARTUM: ICD-10-CM

## 2023-04-13 DIAGNOSIS — O24.019 PRE-EXISTING TYPE 1 DIABETES MELLITUS DURING PREGNANCY, ANTEPARTUM: Primary | ICD-10-CM

## 2023-04-13 DIAGNOSIS — E03.9 HYPOTHYROID IN PREGNANCY, ANTEPARTUM: ICD-10-CM

## 2023-04-13 DIAGNOSIS — Z78.9 NON-SMOKER: ICD-10-CM

## 2023-04-13 LAB
GLUCOSE UR STRIP-MCNC: ABNORMAL MG/DL
PROT UR STRIP-MCNC: NEGATIVE MG/DL

## 2023-04-13 PROCEDURE — 0502F SUBSEQUENT PRENATAL CARE: CPT | Performed by: OBSTETRICS & GYNECOLOGY

## 2023-04-13 NOTE — PROGRESS NOTES
Increased nausea over the past week, and just starting to have some mild swelling in ankles.  Not hurting any  Patient reports fsbs have been running higher this week, up to 210's after just eating an egg.  No recent adjustments to insulin.  Patient does not carb count because she simply does not eat carbs at most of her meals.  Will see MFM again and have growth scan  BPP today 8/8, IZA 18

## 2023-04-17 ENCOUNTER — HOSPITAL ENCOUNTER (INPATIENT)
Facility: HOSPITAL | Age: 28
LOS: 5 days | Discharge: HOME | End: 2023-04-22
Attending: OBSTETRICS & GYNECOLOGY | Admitting: OBSTETRICS & GYNECOLOGY
Payer: COMMERCIAL

## 2023-04-17 ENCOUNTER — HOSPITAL ENCOUNTER (OUTPATIENT)
Facility: HOSPITAL | Age: 28
Discharge: HOME | End: 2023-04-17
Attending: OBSTETRICS & GYNECOLOGY | Admitting: OBSTETRICS & GYNECOLOGY
Payer: COMMERCIAL

## 2023-04-17 ENCOUNTER — HOSPITAL ENCOUNTER (OUTPATIENT)
Dept: LABOR AND DELIVERY | Facility: HOSPITAL | Age: 28
Setting detail: OBSERVATION
Discharge: HOME | End: 2023-04-17
Payer: COMMERCIAL

## 2023-04-17 VITALS
TEMPERATURE: 99.1 F | BODY MASS INDEX: 25.76 KG/M2 | DIASTOLIC BLOOD PRESSURE: 68 MMHG | SYSTOLIC BLOOD PRESSURE: 109 MMHG | HEIGHT: 68 IN | OXYGEN SATURATION: 98 % | WEIGHT: 170 LBS | HEART RATE: 82 BPM

## 2023-04-17 PROBLEM — Z34.90 PREGNANCY: Status: ACTIVE | Noted: 2023-04-17

## 2023-04-17 LAB
ABO GROUP BLD: NORMAL
ALBUMIN SERPL-MCNC: 3.6 G/DL (ref 3.5–5.2)
ALBUMIN/GLOB SERPL: 1.2 G/DL
ALP SERPL-CCNC: 88 U/L (ref 39–117)
ALT SERPL W P-5'-P-CCNC: 17 U/L (ref 1–33)
ANION GAP SERPL CALCULATED.3IONS-SCNC: 11 MMOL/L (ref 5–15)
AST SERPL-CCNC: 19 U/L (ref 1–32)
BASOPHILS # BLD AUTO: 0.04 10*3/MM3 (ref 0–0.2)
BASOPHILS NFR BLD AUTO: 0.4 % (ref 0–1.5)
BILIRUB SERPL-MCNC: 0.4 MG/DL (ref 0–1.2)
BLD GP AB SCN SERPL QL: NEGATIVE
BUN SERPL-MCNC: 11 MG/DL (ref 6–20)
BUN/CREAT SERPL: 23.4 (ref 7–25)
CALCIUM SPEC-SCNC: 8.9 MG/DL (ref 8.6–10.5)
CHLORIDE SERPL-SCNC: 105 MMOL/L (ref 98–107)
CO2 SERPL-SCNC: 20 MMOL/L (ref 22–29)
CREAT SERPL-MCNC: 0.47 MG/DL (ref 0.57–1)
DEPRECATED RDW RBC AUTO: 38.2 FL (ref 37–54)
DEPRECATED RDW RBC AUTO: 38.5 FL (ref 37–54)
EGFRCR SERPLBLD CKD-EPI 2021: 133.2 ML/MIN/1.73
EOSINOPHIL # BLD AUTO: 0.04 10*3/MM3 (ref 0–0.4)
EOSINOPHIL NFR BLD AUTO: 0.4 % (ref 0.3–6.2)
ERYTHROCYTE [DISTWIDTH] IN BLOOD BY AUTOMATED COUNT: 12.4 % (ref 12.3–15.4)
ERYTHROCYTE [DISTWIDTH] IN BLOOD BY AUTOMATED COUNT: 12.4 % (ref 12.3–15.4)
EXPIRATION DATE: NORMAL
GLOBULIN UR ELPH-MCNC: 2.9 GM/DL
GLUCOSE SERPL-MCNC: 117 MG/DL (ref 65–99)
HCT VFR BLD AUTO: 34.6 % (ref 34–46.6)
HCT VFR BLD AUTO: 34.7 % (ref 34–46.6)
HGB BLD-MCNC: 11.2 G/DL (ref 12–15.9)
HGB BLD-MCNC: 11.4 G/DL (ref 12–15.9)
IMM GRANULOCYTES # BLD AUTO: 0.05 10*3/MM3 (ref 0–0.05)
IMM GRANULOCYTES NFR BLD AUTO: 0.6 % (ref 0–0.5)
LDH SERPL-CCNC: 185 U/L (ref 135–214)
LYMPHOCYTES # BLD AUTO: 1.34 10*3/MM3 (ref 0.7–3.1)
LYMPHOCYTES NFR BLD AUTO: 15 % (ref 19.6–45.3)
Lab: NORMAL
MCH RBC QN AUTO: 27.7 PG (ref 26.6–33)
MCH RBC QN AUTO: 28.1 PG (ref 26.6–33)
MCHC RBC AUTO-ENTMCNC: 32.3 G/DL (ref 31.5–35.7)
MCHC RBC AUTO-ENTMCNC: 32.9 G/DL (ref 31.5–35.7)
MCV RBC AUTO: 85.4 FL (ref 79–97)
MCV RBC AUTO: 85.9 FL (ref 79–97)
MONOCYTES # BLD AUTO: 0.7 10*3/MM3 (ref 0.1–0.9)
MONOCYTES NFR BLD AUTO: 7.8 % (ref 5–12)
NEUTROPHILS NFR BLD AUTO: 6.78 10*3/MM3 (ref 1.7–7)
NEUTROPHILS NFR BLD AUTO: 75.8 % (ref 42.7–76)
NRBC BLD AUTO-RTO: 0 /100 WBC (ref 0–0.2)
PLATELET # BLD AUTO: 213 10*3/MM3 (ref 140–450)
PLATELET # BLD AUTO: 221 10*3/MM3 (ref 140–450)
PMV BLD AUTO: 11.6 FL (ref 6–12)
PMV BLD AUTO: 11.7 FL (ref 6–12)
POTASSIUM SERPL-SCNC: 3.9 MMOL/L (ref 3.5–5.2)
PROT SERPL-MCNC: 6.5 G/DL (ref 6–8.5)
PROT UR STRIP-MCNC: NEGATIVE MG/DL
RBC # BLD AUTO: 4.04 10*6/MM3 (ref 3.77–5.28)
RBC # BLD AUTO: 4.05 10*6/MM3 (ref 3.77–5.28)
RH BLD: POSITIVE
SODIUM SERPL-SCNC: 136 MMOL/L (ref 136–145)
T&S EXPIRATION DATE: NORMAL
URATE SERPL-MCNC: 3.2 MG/DL (ref 2.4–5.7)
WBC NRBC COR # BLD: 8.95 10*3/MM3 (ref 3.4–10.8)
WBC NRBC COR # BLD: 9.6 10*3/MM3 (ref 3.4–10.8)

## 2023-04-17 PROCEDURE — 81002 URINALYSIS NONAUTO W/O SCOPE: CPT | Performed by: OBSTETRICS & GYNECOLOGY

## 2023-04-17 PROCEDURE — 84550 ASSAY OF BLOOD/URIC ACID: CPT | Performed by: OBSTETRICS & GYNECOLOGY

## 2023-04-17 PROCEDURE — 86900 BLOOD TYPING SEROLOGIC ABO: CPT | Performed by: OBSTETRICS & GYNECOLOGY

## 2023-04-17 PROCEDURE — 85027 COMPLETE CBC AUTOMATED: CPT | Performed by: OBSTETRICS & GYNECOLOGY

## 2023-04-17 PROCEDURE — 80053 COMPREHEN METABOLIC PANEL: CPT | Performed by: OBSTETRICS & GYNECOLOGY

## 2023-04-17 PROCEDURE — 86901 BLOOD TYPING SEROLOGIC RH(D): CPT | Performed by: OBSTETRICS & GYNECOLOGY

## 2023-04-17 PROCEDURE — 86850 RBC ANTIBODY SCREEN: CPT | Performed by: OBSTETRICS & GYNECOLOGY

## 2023-04-17 PROCEDURE — G0378 HOSPITAL OBSERVATION PER HR: HCPCS

## 2023-04-17 PROCEDURE — 85025 COMPLETE CBC W/AUTO DIFF WBC: CPT | Performed by: OBSTETRICS & GYNECOLOGY

## 2023-04-17 PROCEDURE — S0260 H&P FOR SURGERY: HCPCS | Performed by: OBSTETRICS & GYNECOLOGY

## 2023-04-17 PROCEDURE — G0463 HOSPITAL OUTPT CLINIC VISIT: HCPCS

## 2023-04-17 PROCEDURE — 83615 LACTATE (LD) (LDH) ENZYME: CPT | Performed by: OBSTETRICS & GYNECOLOGY

## 2023-04-17 PROCEDURE — 36415 COLL VENOUS BLD VENIPUNCTURE: CPT | Performed by: OBSTETRICS & GYNECOLOGY

## 2023-04-17 PROCEDURE — 3E0DXGC INTRODUCTION OF OTHER THERAPEUTIC SUBSTANCE INTO MOUTH AND PHARYNX, EXTERNAL APPROACH: ICD-10-PCS | Performed by: OBSTETRICS & GYNECOLOGY

## 2023-04-17 RX ORDER — MORPHINE SULFATE 10 MG/ML
10 INJECTION INTRAMUSCULAR; INTRAVENOUS; SUBCUTANEOUS
Status: DISCONTINUED | OUTPATIENT
Start: 2023-04-17 | End: 2023-04-20 | Stop reason: HOSPADM

## 2023-04-17 RX ORDER — MISOPROSTOL 100 MCG
25 TABLET ORAL EVERY 4 HOURS
Status: DISCONTINUED | OUTPATIENT
Start: 2023-04-17 | End: 2023-04-18

## 2023-04-17 RX ORDER — NICOTINE POLACRILEX 4 MG
15 LOZENGE BUCCAL
Status: DISCONTINUED | OUTPATIENT
Start: 2023-04-17 | End: 2023-04-20

## 2023-04-17 RX ORDER — SODIUM CHLORIDE, SODIUM LACTATE, POTASSIUM CHLORIDE, CALCIUM CHLORIDE 600; 310; 30; 20 MG/100ML; MG/100ML; MG/100ML; MG/100ML
125 INJECTION, SOLUTION INTRAVENOUS CONTINUOUS
Status: DISCONTINUED | OUTPATIENT
Start: 2023-04-17 | End: 2023-04-20

## 2023-04-17 RX ORDER — OXYTOCIN/0.9 % SODIUM CHLORIDE 30/500 ML
2-20 PLASTIC BAG, INJECTION (ML) INTRAVENOUS
Status: DISCONTINUED | OUTPATIENT
Start: 2023-04-17 | End: 2023-04-19

## 2023-04-17 RX ORDER — ACETAMINOPHEN 325 MG/1
650 TABLET ORAL EVERY 4 HOURS PRN
Status: DISCONTINUED | OUTPATIENT
Start: 2023-04-17 | End: 2023-04-20 | Stop reason: SDUPTHER

## 2023-04-17 RX ORDER — PROMETHAZINE HYDROCHLORIDE 25 MG/1
12.5 TABLET ORAL EVERY 6 HOURS PRN
Status: DISCONTINUED | OUTPATIENT
Start: 2023-04-17 | End: 2023-04-20 | Stop reason: HOSPADM

## 2023-04-17 RX ORDER — SODIUM CHLORIDE 0.9 % (FLUSH) 0.9 %
10 SYRINGE (ML) INJECTION EVERY 12 HOURS SCHEDULED
Status: DISCONTINUED | OUTPATIENT
Start: 2023-04-17 | End: 2023-04-20 | Stop reason: HOSPADM

## 2023-04-17 RX ORDER — LIDOCAINE HYDROCHLORIDE 10 MG/ML
5 INJECTION, SOLUTION EPIDURAL; INFILTRATION; INTRACAUDAL; PERINEURAL AS NEEDED
Status: DISCONTINUED | OUTPATIENT
Start: 2023-04-17 | End: 2023-04-20 | Stop reason: HOSPADM

## 2023-04-17 RX ORDER — DEXTROSE MONOHYDRATE 25 G/50ML
25 INJECTION, SOLUTION INTRAVENOUS
Status: DISCONTINUED | OUTPATIENT
Start: 2023-04-17 | End: 2023-04-20

## 2023-04-17 RX ORDER — PROMETHAZINE HYDROCHLORIDE 12.5 MG/1
12.5 SUPPOSITORY RECTAL EVERY 6 HOURS PRN
Status: DISCONTINUED | OUTPATIENT
Start: 2023-04-17 | End: 2023-04-20 | Stop reason: HOSPADM

## 2023-04-17 RX ORDER — SODIUM CHLORIDE 0.9 % (FLUSH) 0.9 %
10 SYRINGE (ML) INJECTION AS NEEDED
Status: DISCONTINUED | OUTPATIENT
Start: 2023-04-17 | End: 2023-04-20 | Stop reason: HOSPADM

## 2023-04-17 RX ORDER — TERBUTALINE SULFATE 1 MG/ML
0.25 INJECTION, SOLUTION SUBCUTANEOUS AS NEEDED
Status: DISCONTINUED | OUTPATIENT
Start: 2023-04-17 | End: 2023-04-20 | Stop reason: HOSPADM

## 2023-04-17 RX ORDER — ONDANSETRON 4 MG/1
4 TABLET, FILM COATED ORAL EVERY 6 HOURS PRN
Status: DISCONTINUED | OUTPATIENT
Start: 2023-04-17 | End: 2023-04-20 | Stop reason: HOSPADM

## 2023-04-17 RX ORDER — ONDANSETRON 2 MG/ML
4 INJECTION INTRAMUSCULAR; INTRAVENOUS EVERY 6 HOURS PRN
Status: DISCONTINUED | OUTPATIENT
Start: 2023-04-17 | End: 2023-04-20 | Stop reason: HOSPADM

## 2023-04-17 RX ADMIN — SODIUM CHLORIDE, POTASSIUM CHLORIDE, SODIUM LACTATE AND CALCIUM CHLORIDE 125 ML/HR: 600; 310; 30; 20 INJECTION, SOLUTION INTRAVENOUS at 18:50

## 2023-04-17 RX ADMIN — Medication 25 MCG: at 20:30

## 2023-04-17 NOTE — NURSING NOTE
Patient presents to unit from [facility] office for PIH labs and BP monitoring.    Melissa Antoine RN  13:34 CDT

## 2023-04-17 NOTE — NURSING NOTE
Patient educated to return to L&D at 1830 for IOL.    Patient educated on S/S of labor, educated to return to L&D for consistent, painful contractions, leaking of fluid, vaginal bleeding, or decreased fetal movement. Urgent maternal warnings signs reviewed and handout given.    Melissa Antoine RN  13:34 CDT

## 2023-04-17 NOTE — H&P (VIEW-ONLY)
" [place]  Nelia Whitehead  : 1995  MRN: [id number]  CSN: [id number]    History and Physical    Subjective   Nelia Whitehead is a 28 y.o. year old  with an Estimated Date of Delivery: 23 currently at 36w6d, who was sent to L&D today from [facility].      Prenatal care has been with Dr. Rosie Gandara.  It has been complicated by type I DM with an insulin pump.  Today, her BP was also elevated and she was sent up for preeclampsia labs.  Dr. Rizvi has recommended delivery at 37 weeks, so patient will return this evening for low-dose pitocin overnight.    OB History    Para Term  AB Living   1 0 0 0 0 0   SAB IAB Ectopic Molar Multiple Live Births   0 0 0 0 0 0      # Outcome Date GA Lbr Hugo/2nd Weight Sex Delivery Anes PTL Lv   1 Current              Past Medical History:   Diagnosis Date   • Diabetes mellitus     type 1. Pt is on insulin pump   • Dysmenorrhea    • Gastroparesis    • Thyromegaly      Past Surgical History:   Procedure Laterality Date   • CHOLECYSTECTOMY     • COLONOSCOPY N/A 2017    Localized mild active inflammation was found in the rectum   • ENDOSCOPY N/A 2017    Normal exam   • WISDOM TOOTH EXTRACTION       No current facility-administered medications for this encounter.    No Known Allergies  Social History    Tobacco Use      Smoking status: Never      Smokeless tobacco: Never    Review of Systems   Constitutional: Negative for activity change and unexpected weight change.   Respiratory: Negative for shortness of breath.    Cardiovascular: Negative for chest pain.   Gastrointestinal: Negative for abdominal pain.   Genitourinary: Negative for vaginal bleeding and vaginal discharge.         Objective   /68 (BP Location: Left arm, Patient Position: Lying)   Pulse 82   Temp 99.1 °F (37.3 °C) (Oral)   Ht 172.7 cm (68\")   Wt 77.1 kg (170 lb)   LMP 2022   SpO2 98%   BMI 25.85 kg/m²   General: well developed; well nourished  no acute " distress   Heart: Not performed.   Lungs: breathing is unlabored   Abdomen: soft, non-tender; no masses   FHT's: reactive, reassuring   Cervix: was not checked.   Presentation: cephalic   Contractions:  EFW by Leopold's:  EFW by recent u/s: none           Prenatal Labs  Lab Results   Component Value Date    HGB 11.2 (L) 04/17/2023    HEPBSAG Negative 09/28/2022    ABORH A Rh Positive 11/11/2015    ABO A 09/28/2022    RH Positive 09/28/2022    ABSCRN Negative 09/28/2022    [id number] Non Reactive 09/28/2022    HEPCVIRUSABY <0.1 09/28/2022    URINECX Final report 09/28/2022       Current Labs Reviewed   No data reviewed.  Labs currently still pending       Assessment   1. IUP at 36w6d  2. Type I DM, with an insulin pump  3. Fetus reassuring  4. Group B strep status: negative  5.   Elevated BP     Plan   1. return this evening for IOL    Rosie Gandara MD  4/17/2023  13:07 CDT

## 2023-04-17 NOTE — H&P
" [place]  Nelia Whitehead  : 1995  MRN: [id number]  CSN: [id number]    History and Physical    Subjective   Nelia Whitehead is a 28 y.o. year old  with an Estimated Date of Delivery: 23 currently at 36w6d, who was sent to L&D today from [facility].      Prenatal care has been with Dr. Rosie Gandara.  It has been complicated by type I DM with an insulin pump.  Today, her BP was also elevated and she was sent up for preeclampsia labs.  Dr. Rizvi has recommended delivery at 37 weeks, so patient will return this evening for low-dose pitocin overnight.    OB History    Para Term  AB Living   1 0 0 0 0 0   SAB IAB Ectopic Molar Multiple Live Births   0 0 0 0 0 0      # Outcome Date GA Lbr Hugo/2nd Weight Sex Delivery Anes PTL Lv   1 Current              Past Medical History:   Diagnosis Date   • Diabetes mellitus     type 1. Pt is on insulin pump   • Dysmenorrhea    • Gastroparesis    • Thyromegaly      Past Surgical History:   Procedure Laterality Date   • CHOLECYSTECTOMY     • COLONOSCOPY N/A 2017    Localized mild active inflammation was found in the rectum   • ENDOSCOPY N/A 2017    Normal exam   • WISDOM TOOTH EXTRACTION       No current facility-administered medications for this encounter.    No Known Allergies  Social History    Tobacco Use      Smoking status: Never      Smokeless tobacco: Never    Review of Systems   Constitutional: Negative for activity change and unexpected weight change.   Respiratory: Negative for shortness of breath.    Cardiovascular: Negative for chest pain.   Gastrointestinal: Negative for abdominal pain.   Genitourinary: Negative for vaginal bleeding and vaginal discharge.         Objective   /68 (BP Location: Left arm, Patient Position: Lying)   Pulse 82   Temp 99.1 °F (37.3 °C) (Oral)   Ht 172.7 cm (68\")   Wt 77.1 kg (170 lb)   LMP 2022   SpO2 98%   BMI 25.85 kg/m²   General: well developed; well nourished  no acute " distress   Heart: Not performed.   Lungs: breathing is unlabored   Abdomen: soft, non-tender; no masses   FHT's: reactive, reassuring   Cervix: was not checked.   Presentation: cephalic   Contractions:  EFW by Leopold's:  EFW by recent u/s: none           Prenatal Labs  Lab Results   Component Value Date    HGB 11.2 (L) 04/17/2023    HEPBSAG Negative 09/28/2022    ABORH A Rh Positive 11/11/2015    ABO A 09/28/2022    RH Positive 09/28/2022    ABSCRN Negative 09/28/2022    [id number] Non Reactive 09/28/2022    HEPCVIRUSABY <0.1 09/28/2022    URINECX Final report 09/28/2022       Current Labs Reviewed   No data reviewed.  Labs currently still pending       Assessment   1. IUP at 36w6d  2. Type I DM, with an insulin pump  3. Fetus reassuring  4. Group B strep status: negative  5.   Elevated BP     Plan   1. return this evening for IOL    Rosie Gandara MD  4/17/2023  13:07 CDT

## 2023-04-18 PROCEDURE — 3E033VJ INTRODUCTION OF OTHER HORMONE INTO PERIPHERAL VEIN, PERCUTANEOUS APPROACH: ICD-10-PCS | Performed by: OBSTETRICS & GYNECOLOGY

## 2023-04-18 RX ORDER — CARBOPROST TROMETHAMINE 250 UG/ML
250 INJECTION, SOLUTION INTRAMUSCULAR
Status: DISCONTINUED | OUTPATIENT
Start: 2023-04-18 | End: 2023-04-20 | Stop reason: HOSPADM

## 2023-04-18 RX ORDER — LEVOTHYROXINE SODIUM 0.07 MG/1
75 TABLET ORAL
Status: DISCONTINUED | OUTPATIENT
Start: 2023-04-18 | End: 2023-04-20

## 2023-04-18 RX ORDER — METHYLERGONOVINE MALEATE 0.2 MG/ML
200 INJECTION INTRAVENOUS ONCE AS NEEDED
Status: DISCONTINUED | OUTPATIENT
Start: 2023-04-18 | End: 2023-04-20 | Stop reason: HOSPADM

## 2023-04-18 RX ORDER — DOCUSATE SODIUM 100 MG/1
100 CAPSULE, LIQUID FILLED ORAL 2 TIMES DAILY
Status: DISCONTINUED | OUTPATIENT
Start: 2023-04-18 | End: 2023-04-20

## 2023-04-18 RX ORDER — MISOPROSTOL 200 UG/1
800 TABLET ORAL ONCE AS NEEDED
Status: DISCONTINUED | OUTPATIENT
Start: 2023-04-18 | End: 2023-04-20 | Stop reason: HOSPADM

## 2023-04-18 RX ADMIN — Medication 2 MILLI-UNITS/MIN: at 21:10

## 2023-04-18 RX ADMIN — DOCUSATE SODIUM 100 MG: 100 CAPSULE ORAL at 17:41

## 2023-04-18 RX ADMIN — SODIUM CHLORIDE, POTASSIUM CHLORIDE, SODIUM LACTATE AND CALCIUM CHLORIDE 125 ML/HR: 600; 310; 30; 20 INJECTION, SOLUTION INTRAVENOUS at 04:44

## 2023-04-18 RX ADMIN — Medication 2 MILLI-UNITS/MIN: at 00:31

## 2023-04-18 RX ADMIN — LEVOTHYROXINE SODIUM 75 MCG: 75 TABLET ORAL at 17:41

## 2023-04-18 NOTE — NURSING NOTE
Patient arrived to R with a dexcom on her right upper arm and a omnipod 5 insulin pump on her right outer thigh.  Patient is capable and competent in operating both. MD is aware that she has been and will be using these devices. Insulin Pump contract was filled out by patient and signed by RN.  Order was placed for patient to continue using the insulin pump as she had before arrival.

## 2023-04-18 NOTE — PLAN OF CARE
Goal Outcome Evaluation:  Plan of Care Reviewed With: patient, mother        Progress: improving  Outcome Evaluation:   SVE 0/0/-4   IOL for Type 1 DM and some elevated BPs.  Cytotec 25 mcg x 1.  LDP started after 1 dose of cytotec.  BP have been WNL all shift.  Patient has been afebrile with no pain throughout the night. Starting to increase pitocin at 0600.

## 2023-04-18 NOTE — PROGRESS NOTES
Rk Santacruz MD  [facility]  [address]  [place] [address]  Office [phone]  Fax [phone]      [facility]  N[facility]  : 1995  MRN: [id number]  CSN: [id number]    Labor progress note    Subjective   She reports is having no problems. Per RN, fetal presenting part not confirmed on exam.      Objective   Min/max vitals past 24 hours:  Temp  Min: 99.1 °F (37.3 °C)  Max: 99.1 °F (37.3 °C)   BP  Min: 109/68  Max: 143/70   Pulse  Min: 63  Max: 82   Resp  Min: 18  Max: 18        FHT's: reactive, reassuring and category 1.  external monitors used   Cervix: was checked (by RN): 0 cm   Contractions:  Ultrasound (bedsidE) irregular   Vertex, shown to patient, her family and RN        Assessment   1. IUP at 36w6d  2. Type 1 diabetes  3. GBS negative  4. Elevated blood pressure     Plan   Continue with augmentation  Allow labor to continue pending maternal and fetal status  Plan discussed with family and questions answered.  Understanding verbalized.    Rk Santacruz MD  2023  20:43 CDT

## 2023-04-18 NOTE — INTERVAL H&P NOTE
H&P reviewed. The patient was examined and there are no changes to the H&P.  Cervix examined again this morning and patient is still cl/th/high.  Patient having regular contractions every 1-2 minutes, but reports they are not painful.  She received low-dose pitocin overnight, and it is now at 14 miU/minutes    FHT's reactive and reassuring.  No decels.

## 2023-04-18 NOTE — PLAN OF CARE
Goal Outcome Evaluation:  Plan of Care Reviewed With: patient, spouse, mother        Progress: no change     Pt on 20 milliunits/min of pitocin since 09:40. Pt feeling some contractions but most are not painful. Pitocin stopped at 16:45 for 4 hours. Pt may shower and eat off the monitor. Restart pitocin at 21:00. Go up on pitocin per protocol.

## 2023-04-18 NOTE — NURSING NOTE
Blood Glucose according to patient's dexcom = 247.  Patient states that she ate an hour and a half ago and that this has been normal since she has been pregnant.  She states that if she administers extra insulin, then she tends to bottom out quickly.

## 2023-04-18 NOTE — NURSING NOTE
Patient's blood glucose according to her dexcom = 207.  Voices that she does not need more insulin since it is coming down.

## 2023-04-19 ENCOUNTER — ANESTHESIA (OUTPATIENT)
Dept: LABOR AND DELIVERY | Facility: HOSPITAL | Age: 28
End: 2023-04-19
Payer: COMMERCIAL

## 2023-04-19 ENCOUNTER — ANESTHESIA EVENT (OUTPATIENT)
Dept: LABOR AND DELIVERY | Facility: HOSPITAL | Age: 28
End: 2023-04-19
Payer: COMMERCIAL

## 2023-04-19 PROCEDURE — C1755 CATHETER, INTRASPINAL: HCPCS | Performed by: NURSE ANESTHETIST, CERTIFIED REGISTERED

## 2023-04-19 PROCEDURE — 25010000002 MORPHINE PER 10 MG: Performed by: OBSTETRICS & GYNECOLOGY

## 2023-04-19 PROCEDURE — 25010000002 ONDANSETRON PER 1 MG: Performed by: OBSTETRICS & GYNECOLOGY

## 2023-04-19 PROCEDURE — 10907ZC DRAINAGE OF AMNIOTIC FLUID, THERAPEUTIC FROM PRODUCTS OF CONCEPTION, VIA NATURAL OR ARTIFICIAL OPENING: ICD-10-PCS | Performed by: OBSTETRICS & GYNECOLOGY

## 2023-04-19 PROCEDURE — 25010000002 ROPIVACAINE PER 1 MG: Performed by: NURSE ANESTHETIST, CERTIFIED REGISTERED

## 2023-04-19 RX ORDER — ROPIVACAINE HYDROCHLORIDE 2 MG/ML
INJECTION, SOLUTION EPIDURAL; INFILTRATION; PERINEURAL AS NEEDED
Status: DISCONTINUED | OUTPATIENT
Start: 2023-04-19 | End: 2023-04-20 | Stop reason: SURG

## 2023-04-19 RX ORDER — LIDOCAINE HYDROCHLORIDE AND EPINEPHRINE 15; 5 MG/ML; UG/ML
INJECTION, SOLUTION EPIDURAL AS NEEDED
Status: DISCONTINUED | OUTPATIENT
Start: 2023-04-19 | End: 2023-04-20 | Stop reason: SURG

## 2023-04-19 RX ORDER — OXYTOCIN/0.9 % SODIUM CHLORIDE 30/500 ML
2-20 PLASTIC BAG, INJECTION (ML) INTRAVENOUS
Status: DISCONTINUED | OUTPATIENT
Start: 2023-04-20 | End: 2023-04-20

## 2023-04-19 RX ORDER — LIDOCAINE HYDROCHLORIDE 20 MG/ML
INJECTION, SOLUTION EPIDURAL; INFILTRATION; INTRACAUDAL; PERINEURAL AS NEEDED
Status: DISCONTINUED | OUTPATIENT
Start: 2023-04-19 | End: 2023-04-20 | Stop reason: SURG

## 2023-04-19 RX ADMIN — SODIUM CHLORIDE, POTASSIUM CHLORIDE, SODIUM LACTATE AND CALCIUM CHLORIDE 125 ML/HR: 600; 310; 30; 20 INJECTION, SOLUTION INTRAVENOUS at 15:10

## 2023-04-19 RX ADMIN — Medication 20 MILLI-UNITS/MIN: at 13:16

## 2023-04-19 RX ADMIN — LIDOCAINE HYDROCHLORIDE 3 ML: 20 INJECTION, SOLUTION EPIDURAL; INFILTRATION; INTRACAUDAL; PERINEURAL at 14:34

## 2023-04-19 RX ADMIN — MORPHINE SULFATE 10 MG: 10 INJECTION INTRAVENOUS at 13:11

## 2023-04-19 RX ADMIN — ROPIVACAINE HYDROCHLORIDE 5 ML/HR: 2 INJECTION, SOLUTION EPIDURAL; INFILTRATION at 14:50

## 2023-04-19 RX ADMIN — ACETAMINOPHEN 650 MG: 325 TABLET ORAL at 09:58

## 2023-04-19 RX ADMIN — LIDOCAINE HYDROCHLORIDE AND EPINEPHRINE 3 ML: 15; 5 INJECTION, SOLUTION EPIDURAL at 14:42

## 2023-04-19 RX ADMIN — SODIUM CHLORIDE, POTASSIUM CHLORIDE, SODIUM LACTATE AND CALCIUM CHLORIDE 1000 ML: 600; 310; 30; 20 INJECTION, SOLUTION INTRAVENOUS at 14:08

## 2023-04-19 RX ADMIN — MORPHINE SULFATE 10 MG: 10 INJECTION INTRAVENOUS at 09:16

## 2023-04-19 RX ADMIN — ROPIVACAINE HYDROCHLORIDE 8 ML: 2 INJECTION, SOLUTION EPIDURAL; INFILTRATION at 14:47

## 2023-04-19 RX ADMIN — LEVOTHYROXINE SODIUM 75 MCG: 75 TABLET ORAL at 06:06

## 2023-04-19 RX ADMIN — ONDANSETRON 4 MG: 2 INJECTION INTRAMUSCULAR; INTRAVENOUS at 21:22

## 2023-04-19 RX ADMIN — ONDANSETRON 4 MG: 2 INJECTION INTRAMUSCULAR; INTRAVENOUS at 11:15

## 2023-04-19 NOTE — PROGRESS NOTES
Alma Rosa Whitehead  : 1995  MRN: [id number]  CSN: [id number]    Labor progress note    Subjective   Patient currently reports is having no problems.  Patient denies any pain.  She is currently taking a pitocin break to have dinner and let her uterus rest.  NO LOF or VB     Objective   Min/max vitals past 24 hours:  Temp  Min: 97.8 °F (36.6 °C)  Max: 98.2 °F (36.8 °C)   BP  Min: 110/54  Max: 138/77   Pulse  Min: 50  Max: 77   Resp  Min: 16  Max: 16        FHT's: reactive, reassuring  120 + accels, no decels, moderate variability   Cervix: was not checked.  Cx was still closed on last exam by RN   Contractions: occasional          Assessment   1. IUP at 37w0d with IDDM.  Patient controlling her glucose with Dexcom and insulin pump  2. GBS negative  3. Fetus reassuring     Plan   1. Continue with induction    Rosie Gandara MD  2023  20:54 CDT

## 2023-04-19 NOTE — PROGRESS NOTES
Alma Rosa Whitehead  : 1995  MRN: [id number]  CSN: [id number]    Labor progress note    Subjective   Patient currently reports is having no problems.  Contractions very regular, but not painful.  Patient managing glucose with Dexcom and insulin pump; she had dinner, but does not want to eat any breakfast.  2044 -    2224 -  99    0029 -  85    0145 -  105    0430 -  113     Objective   Min/max vitals past 24 hours:  Temp  Min: 97.8 °F (36.6 °C)  Max: 98.4 °F (36.9 °C)   BP  Min: 103/42  Max: 138/77   Pulse  Min: 53  Max: 77   Resp  Min: 16  Max: 18        FHT's: reactive, reassuring   Cervix: was checked (by me): 1 cm / 90 % / -3.  AROM with clear fluid   Contractions: regular every 3 minutes          Assessment   1. IUP at 37w1d.  IOL for IDDM  2. GBS negative  3. Fetus reassuring     Plan   1. Continue with induction    oRsie Gandara MD  2023  08:18 CDT

## 2023-04-19 NOTE — PLAN OF CARE
Goal Outcome Evaluation:  Plan of Care Reviewed With: patient, spouse, mother        Progress: improving  Outcome Evaluation:  SVE remains unchanged.  Type 1 DM and some elevated BPs.  One dose of cytotec.  LDP, then pitocin progressed to 20 mu/min on .  Pit break, then pitocin restarted and returned to 20 mu/min.  Patient a febrile with no pain reported throughout the night. BP WNL.

## 2023-04-19 NOTE — ANESTHESIA PREPROCEDURE EVALUATION
Anesthesia Evaluation     Patient summary reviewed and Nursing notes reviewed   NPO Solid Status: N/A  NPO Liquid Status: N/A           Airway   Mallampati: II  TM distance: >3 FB  Neck ROM: full  Dental - normal exam     Pulmonary - negative pulmonary ROS and normal exam   Cardiovascular - negative cardio ROS and normal exam  Exercise tolerance: excellent (>7 METS)        Neuro/Psych- negative ROS  GI/Hepatic/Renal/Endo    (+)   diabetes mellitus type 1 using insulin, thyroid problem hyperthyroidism    Musculoskeletal (-) negative ROS    Abdominal  - normal exam   Substance History - negative use     OB/GYN    (+) Pregnant,         Other - negative ROS                       Anesthesia Plan    ASA 2     epidural       Anesthetic plan, risks, benefits, and alternatives have been provided, discussed and informed consent has been obtained with: patient.        CODE STATUS:

## 2023-04-19 NOTE — NURSING NOTE
Dexcom readings:    4/18 2044 -  91  4/18  2224 -  99  4/19  0029 -  85  4/19  0145 -  105  4/19  1180 -  194

## 2023-04-19 NOTE — ANESTHESIA PROCEDURE NOTES
Labor Epidural      Patient reassessed immediately prior to procedure    Patient location during procedure: OB  Start Time: 4/19/2023 2:30 PM  Stop Time: 4/19/2023 2:50 PM  Performed By  CRNA/CAA: Nicolas Harmon CRNA  Preanesthetic Checklist  Completed: patient identified, IV checked, site marked, risks and benefits discussed, surgical consent, monitors and equipment checked, pre-op evaluation and timeout performed  Prep:  Pt Position:sitting  Sterile Tech:cap, gown and sterile barrier  Prep:DuraPrep  Monitoring:blood pressure monitoring, continuous pulse oximetry and EKG  Epidural Block Procedure:  Approach:midline  Guidance:palpation technique  Location:L2-L3  Needle Type:Tuohy  Needle Gauge:18 G  Loss of Resistance Medium: saline  Loss of Resistance: 7cm  Cath Depth at skin:13 cm  Paresthesia: none  Aspiration:negative  Test Dose:negative  Number of Attempts: 1  Post Assessment:  Dressing:occlusive dressing applied and secured with tape  Pt Tolerance:patient tolerated the procedure well with no apparent complications  Complications:no

## 2023-04-20 PROBLEM — Z34.90 PREGNANCY: Status: RESOLVED | Noted: 2023-04-17 | Resolved: 2023-04-20

## 2023-04-20 PROCEDURE — 25010000002 METOCLOPRAMIDE PER 10 MG: Performed by: OBSTETRICS & GYNECOLOGY

## 2023-04-20 PROCEDURE — 25010000002 OXYTOCIN PER 10 UNITS

## 2023-04-20 PROCEDURE — 25010000002 KETOROLAC TROMETHAMINE PER 15 MG

## 2023-04-20 PROCEDURE — 25010000002 GENTAMICIN PER 80 MG: Performed by: OBSTETRICS & GYNECOLOGY

## 2023-04-20 PROCEDURE — 59510 CESAREAN DELIVERY: CPT | Performed by: OBSTETRICS & GYNECOLOGY

## 2023-04-20 PROCEDURE — 51702 INSERT TEMP BLADDER CATH: CPT

## 2023-04-20 PROCEDURE — 25010000002 PROPOFOL 10 MG/ML EMULSION

## 2023-04-20 PROCEDURE — 25010000002 ONDANSETRON PER 1 MG: Performed by: OBSTETRICS & GYNECOLOGY

## 2023-04-20 PROCEDURE — 25010000002 KETOROLAC TROMETHAMINE PER 15 MG: Performed by: OBSTETRICS & GYNECOLOGY

## 2023-04-20 PROCEDURE — 25010000002 HYDROMORPHONE 1 MG/ML SOLUTION

## 2023-04-20 PROCEDURE — 25010000002 FENTANYL CITRATE (PF) 100 MCG/2ML SOLUTION

## 2023-04-20 PROCEDURE — 25010000002 ONDANSETRON PER 1 MG

## 2023-04-20 DEVICE — HEMOST ABS SURGICEL PWDR 3GM: Type: IMPLANTABLE DEVICE | Site: ABDOMEN | Status: FUNCTIONAL

## 2023-04-20 RX ORDER — NICOTINE POLACRILEX 4 MG
15 LOZENGE BUCCAL
Status: DISCONTINUED | OUTPATIENT
Start: 2023-04-20 | End: 2023-04-22 | Stop reason: HOSPADM

## 2023-04-20 RX ORDER — ACETAMINOPHEN 500 MG
1000 TABLET ORAL EVERY 6 HOURS
Status: COMPLETED | OUTPATIENT
Start: 2023-04-20 | End: 2023-04-21

## 2023-04-20 RX ORDER — OXYTOCIN 10 [USP'U]/ML
INJECTION, SOLUTION INTRAMUSCULAR; INTRAVENOUS AS NEEDED
Status: DISCONTINUED | OUTPATIENT
Start: 2023-04-20 | End: 2023-04-20 | Stop reason: SURG

## 2023-04-20 RX ORDER — ONDANSETRON 2 MG/ML
INJECTION INTRAMUSCULAR; INTRAVENOUS AS NEEDED
Status: DISCONTINUED | OUTPATIENT
Start: 2023-04-20 | End: 2023-04-20 | Stop reason: SURG

## 2023-04-20 RX ORDER — ONDANSETRON 4 MG/1
4 TABLET, FILM COATED ORAL EVERY 6 HOURS PRN
Status: DISCONTINUED | OUTPATIENT
Start: 2023-04-20 | End: 2023-04-20 | Stop reason: HOSPADM

## 2023-04-20 RX ORDER — OXYTOCIN/0.9 % SODIUM CHLORIDE 30/500 ML
250 PLASTIC BAG, INJECTION (ML) INTRAVENOUS CONTINUOUS
Status: ACTIVE | OUTPATIENT
Start: 2023-04-20 | End: 2023-04-20

## 2023-04-20 RX ORDER — ONDANSETRON 2 MG/ML
4 INJECTION INTRAMUSCULAR; INTRAVENOUS EVERY 6 HOURS PRN
Status: DISCONTINUED | OUTPATIENT
Start: 2023-04-20 | End: 2023-04-20 | Stop reason: HOSPADM

## 2023-04-20 RX ORDER — FENTANYL CITRATE 50 UG/ML
INJECTION, SOLUTION INTRAMUSCULAR; INTRAVENOUS AS NEEDED
Status: DISCONTINUED | OUTPATIENT
Start: 2023-04-20 | End: 2023-04-20 | Stop reason: SURG

## 2023-04-20 RX ORDER — SIMETHICONE 80 MG
80 TABLET,CHEWABLE ORAL 4 TIMES DAILY PRN
Status: DISCONTINUED | OUTPATIENT
Start: 2023-04-20 | End: 2023-04-22 | Stop reason: HOSPADM

## 2023-04-20 RX ORDER — SUCCINYLCHOLINE/SOD CL,ISO/PF 200MG/10ML
SYRINGE (ML) INTRAVENOUS AS NEEDED
Status: DISCONTINUED | OUTPATIENT
Start: 2023-04-20 | End: 2023-04-20 | Stop reason: SURG

## 2023-04-20 RX ORDER — ACETAMINOPHEN 325 MG/1
650 TABLET ORAL EVERY 4 HOURS PRN
Status: DISCONTINUED | OUTPATIENT
Start: 2023-04-20 | End: 2023-04-20 | Stop reason: HOSPADM

## 2023-04-20 RX ORDER — ACETAMINOPHEN 500 MG
1000 TABLET ORAL ONCE
Status: COMPLETED | OUTPATIENT
Start: 2023-04-20 | End: 2023-04-20

## 2023-04-20 RX ORDER — ONDANSETRON 2 MG/ML
4 INJECTION INTRAMUSCULAR; INTRAVENOUS EVERY 6 HOURS PRN
Status: DISCONTINUED | OUTPATIENT
Start: 2023-04-20 | End: 2023-04-22 | Stop reason: HOSPADM

## 2023-04-20 RX ORDER — FAMOTIDINE 20 MG/1
20 TABLET, FILM COATED ORAL ONCE AS NEEDED
Status: DISCONTINUED | OUTPATIENT
Start: 2023-04-20 | End: 2023-04-20 | Stop reason: HOSPADM

## 2023-04-20 RX ORDER — SODIUM CHLORIDE 9 MG/ML
40 INJECTION, SOLUTION INTRAVENOUS AS NEEDED
Status: DISCONTINUED | OUTPATIENT
Start: 2023-04-20 | End: 2023-04-22 | Stop reason: HOSPADM

## 2023-04-20 RX ORDER — PROMETHAZINE HYDROCHLORIDE 25 MG/1
25 TABLET ORAL EVERY 6 HOURS PRN
Status: DISCONTINUED | OUTPATIENT
Start: 2023-04-20 | End: 2023-04-22 | Stop reason: HOSPADM

## 2023-04-20 RX ORDER — IBUPROFEN 600 MG/1
600 TABLET ORAL EVERY 6 HOURS
Status: DISCONTINUED | OUTPATIENT
Start: 2023-04-21 | End: 2023-04-22 | Stop reason: HOSPADM

## 2023-04-20 RX ORDER — BUPIVACAINE HCL/0.9 % NACL/PF 0.125 %
PLASTIC BAG, INJECTION (ML) EPIDURAL AS NEEDED
Status: DISCONTINUED | OUTPATIENT
Start: 2023-04-20 | End: 2023-04-20 | Stop reason: SURG

## 2023-04-20 RX ORDER — FAMOTIDINE 10 MG/ML
20 INJECTION, SOLUTION INTRAVENOUS ONCE AS NEEDED
Status: DISCONTINUED | OUTPATIENT
Start: 2023-04-20 | End: 2023-04-20 | Stop reason: HOSPADM

## 2023-04-20 RX ORDER — KETOROLAC TROMETHAMINE 15 MG/ML
15 INJECTION, SOLUTION INTRAMUSCULAR; INTRAVENOUS EVERY 6 HOURS
Status: COMPLETED | OUTPATIENT
Start: 2023-04-20 | End: 2023-04-21

## 2023-04-20 RX ORDER — PROMETHAZINE HYDROCHLORIDE 25 MG/1
12.5 TABLET ORAL EVERY 6 HOURS PRN
Status: DISCONTINUED | OUTPATIENT
Start: 2023-04-20 | End: 2023-04-20 | Stop reason: HOSPADM

## 2023-04-20 RX ORDER — NALOXONE HCL 0.4 MG/ML
0.4 VIAL (ML) INJECTION
Status: ACTIVE | OUTPATIENT
Start: 2023-04-20 | End: 2023-04-21

## 2023-04-20 RX ORDER — CALCIUM CARBONATE 200(500)MG
2 TABLET,CHEWABLE ORAL 3 TIMES DAILY PRN
Status: DISCONTINUED | OUTPATIENT
Start: 2023-04-20 | End: 2023-04-20 | Stop reason: HOSPADM

## 2023-04-20 RX ORDER — OXYTOCIN/0.9 % SODIUM CHLORIDE 30/500 ML
999 PLASTIC BAG, INJECTION (ML) INTRAVENOUS ONCE
Status: DISCONTINUED | OUTPATIENT
Start: 2023-04-20 | End: 2023-04-22 | Stop reason: HOSPADM

## 2023-04-20 RX ORDER — KETOROLAC TROMETHAMINE 30 MG/ML
INJECTION, SOLUTION INTRAMUSCULAR; INTRAVENOUS AS NEEDED
Status: DISCONTINUED | OUTPATIENT
Start: 2023-04-20 | End: 2023-04-20 | Stop reason: SURG

## 2023-04-20 RX ORDER — SODIUM CHLORIDE 9 MG/ML
40 INJECTION, SOLUTION INTRAVENOUS AS NEEDED
Status: DISCONTINUED | OUTPATIENT
Start: 2023-04-20 | End: 2023-04-20 | Stop reason: HOSPADM

## 2023-04-20 RX ORDER — TRISODIUM CITRATE DIHYDRATE AND CITRIC ACID MONOHYDRATE 500; 334 MG/5ML; MG/5ML
30 SOLUTION ORAL ONCE
Status: DISCONTINUED | OUTPATIENT
Start: 2023-04-20 | End: 2023-04-20 | Stop reason: HOSPADM

## 2023-04-20 RX ORDER — FAMOTIDINE 10 MG/ML
20 INJECTION, SOLUTION INTRAVENOUS ONCE AS NEEDED
Status: COMPLETED | OUTPATIENT
Start: 2023-04-20 | End: 2023-04-20

## 2023-04-20 RX ORDER — BUTORPHANOL TARTRATE 1 MG/ML
0.5 INJECTION, SOLUTION INTRAMUSCULAR; INTRAVENOUS EVERY 6 HOURS PRN
Status: DISCONTINUED | OUTPATIENT
Start: 2023-04-20 | End: 2023-04-22 | Stop reason: HOSPADM

## 2023-04-20 RX ORDER — SODIUM CHLORIDE 0.9 % (FLUSH) 0.9 %
10 SYRINGE (ML) INJECTION EVERY 12 HOURS SCHEDULED
Status: DISCONTINUED | OUTPATIENT
Start: 2023-04-20 | End: 2023-04-20 | Stop reason: HOSPADM

## 2023-04-20 RX ORDER — PROMETHAZINE HYDROCHLORIDE 12.5 MG/1
12.5 SUPPOSITORY RECTAL EVERY 6 HOURS PRN
Status: DISCONTINUED | OUTPATIENT
Start: 2023-04-20 | End: 2023-04-20 | Stop reason: HOSPADM

## 2023-04-20 RX ORDER — SODIUM CHLORIDE 0.9 % (FLUSH) 0.9 %
10 SYRINGE (ML) INJECTION AS NEEDED
Status: DISCONTINUED | OUTPATIENT
Start: 2023-04-20 | End: 2023-04-20 | Stop reason: HOSPADM

## 2023-04-20 RX ORDER — TRISODIUM CITRATE DIHYDRATE AND CITRIC ACID MONOHYDRATE 500; 334 MG/5ML; MG/5ML
30 SOLUTION ORAL ONCE
Status: COMPLETED | OUTPATIENT
Start: 2023-04-20 | End: 2023-04-20

## 2023-04-20 RX ORDER — DEXTROSE MONOHYDRATE 25 G/50ML
25 INJECTION, SOLUTION INTRAVENOUS
Status: DISCONTINUED | OUTPATIENT
Start: 2023-04-20 | End: 2023-04-22 | Stop reason: HOSPADM

## 2023-04-20 RX ORDER — ONDANSETRON 2 MG/ML
4 INJECTION INTRAMUSCULAR; INTRAVENOUS ONCE AS NEEDED
Status: DISCONTINUED | OUTPATIENT
Start: 2023-04-20 | End: 2023-04-20 | Stop reason: HOSPADM

## 2023-04-20 RX ORDER — OXYCODONE HYDROCHLORIDE 5 MG/1
10 TABLET ORAL EVERY 4 HOURS PRN
Status: DISCONTINUED | OUTPATIENT
Start: 2023-04-20 | End: 2023-04-22 | Stop reason: HOSPADM

## 2023-04-20 RX ORDER — KETOROLAC TROMETHAMINE 30 MG/ML
30 INJECTION, SOLUTION INTRAMUSCULAR; INTRAVENOUS ONCE
Status: DISCONTINUED | OUTPATIENT
Start: 2023-04-20 | End: 2023-04-20 | Stop reason: SDUPTHER

## 2023-04-20 RX ORDER — FAMOTIDINE 10 MG/ML
20 INJECTION, SOLUTION INTRAVENOUS ONCE
Status: COMPLETED | OUTPATIENT
Start: 2023-04-20 | End: 2023-04-20

## 2023-04-20 RX ORDER — SODIUM CHLORIDE 0.9 % (FLUSH) 0.9 %
3-10 SYRINGE (ML) INJECTION AS NEEDED
Status: DISCONTINUED | OUTPATIENT
Start: 2023-04-20 | End: 2023-04-22 | Stop reason: HOSPADM

## 2023-04-20 RX ORDER — PRENATAL VIT/IRON FUM/FOLIC AC 27MG-0.8MG
1 TABLET ORAL DAILY
Status: DISCONTINUED | OUTPATIENT
Start: 2023-04-20 | End: 2023-04-22 | Stop reason: HOSPADM

## 2023-04-20 RX ORDER — ONDANSETRON 2 MG/ML
4 INJECTION INTRAMUSCULAR; INTRAVENOUS ONCE
Status: DISCONTINUED | OUTPATIENT
Start: 2023-04-20 | End: 2023-04-20 | Stop reason: HOSPADM

## 2023-04-20 RX ORDER — PROPOFOL 10 MG/ML
VIAL (ML) INTRAVENOUS AS NEEDED
Status: DISCONTINUED | OUTPATIENT
Start: 2023-04-20 | End: 2023-04-20 | Stop reason: SURG

## 2023-04-20 RX ORDER — PROMETHAZINE HYDROCHLORIDE 12.5 MG/1
12.5 SUPPOSITORY RECTAL EVERY 6 HOURS PRN
Status: DISCONTINUED | OUTPATIENT
Start: 2023-04-20 | End: 2023-04-22 | Stop reason: HOSPADM

## 2023-04-20 RX ORDER — ACETAMINOPHEN 325 MG/1
650 TABLET ORAL EVERY 6 HOURS
Status: DISCONTINUED | OUTPATIENT
Start: 2023-04-21 | End: 2023-04-22 | Stop reason: HOSPADM

## 2023-04-20 RX ORDER — NALBUPHINE HCL 10 MG/ML
2.5 AMPUL (ML) INJECTION EVERY 4 HOURS PRN
Status: ACTIVE | OUTPATIENT
Start: 2023-04-20 | End: 2023-04-21

## 2023-04-20 RX ORDER — METOCLOPRAMIDE HYDROCHLORIDE 5 MG/ML
10 INJECTION INTRAMUSCULAR; INTRAVENOUS ONCE
Status: COMPLETED | OUTPATIENT
Start: 2023-04-20 | End: 2023-04-20

## 2023-04-20 RX ORDER — ONDANSETRON 4 MG/1
4 TABLET, FILM COATED ORAL EVERY 8 HOURS PRN
Status: DISCONTINUED | OUTPATIENT
Start: 2023-04-20 | End: 2023-04-22 | Stop reason: HOSPADM

## 2023-04-20 RX ORDER — SODIUM CHLORIDE 0.9 % (FLUSH) 0.9 %
3 SYRINGE (ML) INJECTION EVERY 12 HOURS SCHEDULED
Status: DISCONTINUED | OUTPATIENT
Start: 2023-04-20 | End: 2023-04-22 | Stop reason: HOSPADM

## 2023-04-20 RX ORDER — DROPERIDOL 2.5 MG/ML
0.62 INJECTION, SOLUTION INTRAMUSCULAR; INTRAVENOUS ONCE AS NEEDED
Status: DISCONTINUED | OUTPATIENT
Start: 2023-04-20 | End: 2023-04-20

## 2023-04-20 RX ORDER — OXYCODONE HYDROCHLORIDE 5 MG/1
5 TABLET ORAL EVERY 4 HOURS PRN
Status: DISCONTINUED | OUTPATIENT
Start: 2023-04-20 | End: 2023-04-22 | Stop reason: HOSPADM

## 2023-04-20 RX ADMIN — ACETAMINOPHEN 1000 MG: 500 TABLET, FILM COATED ORAL at 19:04

## 2023-04-20 RX ADMIN — SODIUM CHLORIDE, POTASSIUM CHLORIDE, SODIUM LACTATE AND CALCIUM CHLORIDE: 600; 310; 30; 20 INJECTION, SOLUTION INTRAVENOUS at 11:15

## 2023-04-20 RX ADMIN — LIDOCAINE HYDROCHLORIDE 5 ML: 20 INJECTION, SOLUTION EPIDURAL; INFILTRATION; INTRACAUDAL; PERINEURAL at 10:00

## 2023-04-20 RX ADMIN — SODIUM CHLORIDE, POTASSIUM CHLORIDE, SODIUM LACTATE AND CALCIUM CHLORIDE: 600; 310; 30; 20 INJECTION, SOLUTION INTRAVENOUS at 10:28

## 2023-04-20 RX ADMIN — FAMOTIDINE 20 MG: 10 INJECTION INTRAVENOUS at 09:30

## 2023-04-20 RX ADMIN — METOCLOPRAMIDE 10 MG: 5 INJECTION, SOLUTION INTRAMUSCULAR; INTRAVENOUS at 09:58

## 2023-04-20 RX ADMIN — Medication 200 MCG: at 10:33

## 2023-04-20 RX ADMIN — ONDANSETRON 4 MG: 2 INJECTION INTRAMUSCULAR; INTRAVENOUS at 08:43

## 2023-04-20 RX ADMIN — HYDROMORPHONE HYDROCHLORIDE 1 MG: 1 INJECTION, SOLUTION INTRAMUSCULAR; INTRAVENOUS; SUBCUTANEOUS at 10:45

## 2023-04-20 RX ADMIN — CLINDAMYCIN PHOSPHATE 900 MG: 150 INJECTION, SOLUTION INTRAVENOUS at 10:20

## 2023-04-20 RX ADMIN — KETOROLAC TROMETHAMINE 30 MG: 30 INJECTION, SOLUTION INTRAMUSCULAR; INTRAVENOUS at 10:48

## 2023-04-20 RX ADMIN — OXYTOCIN 40 UNITS: 10 INJECTION INTRAVENOUS at 10:28

## 2023-04-20 RX ADMIN — ONDANSETRON 8 MG: 2 INJECTION INTRAMUSCULAR; INTRAVENOUS at 10:21

## 2023-04-20 RX ADMIN — LEVOTHYROXINE SODIUM 75 MCG: 75 TABLET ORAL at 06:21

## 2023-04-20 RX ADMIN — LIDOCAINE HYDROCHLORIDE 5 ML: 20 INJECTION, SOLUTION EPIDURAL; INFILTRATION; INTRACAUDAL; PERINEURAL at 09:55

## 2023-04-20 RX ADMIN — SODIUM CITRATE AND CITRIC ACID MONOHYDRATE 30 ML: 500; 334 SOLUTION ORAL at 09:47

## 2023-04-20 RX ADMIN — SODIUM CHLORIDE, POTASSIUM CHLORIDE, SODIUM LACTATE AND CALCIUM CHLORIDE 125 ML/HR: 600; 310; 30; 20 INJECTION, SOLUTION INTRAVENOUS at 06:44

## 2023-04-20 RX ADMIN — PROPOFOL 200 MG: 10 INJECTION, EMULSION INTRAVENOUS at 10:24

## 2023-04-20 RX ADMIN — Medication 10 ML: at 12:40

## 2023-04-20 RX ADMIN — Medication 200 MG: at 10:24

## 2023-04-20 RX ADMIN — Medication 200 MCG: at 10:57

## 2023-04-20 RX ADMIN — OXYTOCIN 20 UNITS: 10 INJECTION INTRAVENOUS at 11:15

## 2023-04-20 RX ADMIN — FENTANYL CITRATE 100 MCG: 50 INJECTION INTRAMUSCULAR; INTRAVENOUS at 10:28

## 2023-04-20 RX ADMIN — FAMOTIDINE 20 MG: 10 INJECTION INTRAVENOUS at 09:48

## 2023-04-20 RX ADMIN — ACETAMINOPHEN 1000 MG: 500 TABLET, FILM COATED ORAL at 12:53

## 2023-04-20 RX ADMIN — GENTAMICIN SULFATE 340 MG: 40 INJECTION, SOLUTION INTRAMUSCULAR; INTRAVENOUS at 10:02

## 2023-04-20 RX ADMIN — KETOROLAC TROMETHAMINE 15 MG: 15 INJECTION, SOLUTION INTRAMUSCULAR; INTRAVENOUS at 21:48

## 2023-04-20 RX ADMIN — LIDOCAINE HYDROCHLORIDE 10 ML: 20 INJECTION, SOLUTION EPIDURAL; INFILTRATION; INTRACAUDAL; PERINEURAL at 09:48

## 2023-04-20 RX ADMIN — LIDOCAINE HYDROCHLORIDE 5 ML: 20 INJECTION, SOLUTION EPIDURAL; INFILTRATION; INTRACAUDAL; PERINEURAL at 10:18

## 2023-04-20 RX ADMIN — ACETAMINOPHEN 650 MG: 325 TABLET ORAL at 07:17

## 2023-04-20 RX ADMIN — Medication 10 ML: at 10:04

## 2023-04-20 NOTE — PROGRESS NOTES
Alma Rosa Whitehead  : 1995  MRN: [id number]  CSN: [id number]    Labor progress note    Subjective   Patient currently reports that she is unable to tolerate the discomfort of her pressure.  The patient's epidural has been rebolused multiple times.  She says that her discomfort is mostly from her pressure, but does also reports some pain from her contractions.  The patient has been encouraged, given the fact that she has patiently entered induction for the past 2 days, and is now almost completely dilated with just an anterior lip.  The patient, however, is screaming nonstop and says that she wants to quit.  The patient is requesting, almost demanding, a primary section for delivery.  She is not willing to even consider pushing     Objective   Min/max vitals past 24 hours:  Temp  Min: 97.6 °F (36.4 °C)  Max: 99.8 °F (37.7 °C)   BP  Min: 101/46  Max: 132/65   Pulse  Min: 54  Max: 100   Resp  Min: 16  Max: 18        FHT's: reactive, reassuring              Assessment   1. IUP at 37w2d.  Patient here for IOL secondary to IDDM, and is now AC with an anterior lip  2. GBS negative  3. Fetus reassuring  4. Patient not tolerating labor pain now     Plan   1.  section at patient's requent    Rosie Gandara MD  2023  09:38 CDT

## 2023-04-20 NOTE — PLAN OF CARE
Goal Outcome Evaluation:  Plan of Care Reviewed With: patient, family, significant other        Progress: improving  Outcome Evaluation: , IOL for Type 1 DM, self management of insulin pump and Dexcom, pt report glucose throughout the night; pitocin break overnight, resumed at 0005, current at 18 mu/min; adequate pain control with epidural in place; cervical exam 5-6//-1; pt remains afebrile, vitals stable

## 2023-04-20 NOTE — LACTATION NOTE
Mother's Name: Nelia Phone #:[phone]  Infant Name:  Linh Weiner  :2023  Gestation:37w2d  Day of life:0  Birth weight:  7-10.4 (3470g) Discharge weight:  Weight Loss:   24 hour Summary of Feeds: 1 formula Voids:  Stools:  Assistive devices (shields, shells, etc): nipple shield 20 mm  Significant Maternal history:, type 1 DM,insulin pump & dexcom monitor, hypothyroidism, Gastroparesis  Maternal Concerns: unsure if will be able to breastfeed  Maternal Goal: exclusive breastfeeding at least through maternity leave 16 weeks  Mother's Medications: humalog insulin, Synthroid, PNV  Breastpump for home: Even[employer] double electric  Ped follow up appt:Braden Moran    Notified at 1200p, infant received first feeding of formula 20 ml. Reasoning for formula supplementing, mother under general anesthesia remained in OR at 1 hour from birth, infant high risk for hypoglycemia due to mother with DM. Encouraged to communicate with lactation staff for next feeding.     1435  Notified infant ready for next feeding, BG 52. To mother's room to offer assistance. Fob and maternal grandmother present in room as well. Assisted to place infant skin to skin with mother, fob reports infant has been cueing to feed. Infant immediately calms and falls asleep when placed on mother. With permission, began hand expressing drops of colostrum to infant. Mother easily expresses copious colostrum. Collected 1.5 ml and provided to infant via syringe. Infant initially disinterested but soon began sucking eagerly on gloved finger. To note, infant with visible tight frenulum, close to tip of tongue, does not appear to affect suck on gloved finger. Educated patient and family on signs to monitor for this being an issue. Moved infant to right breast, infant latched without difficulty and began sucking with deep jaw drops, frequent audible swallows. Clicking noted frequently with sucks but mother denying pain. Infant nursed well for 25 mins, at  end of feeding, nipple significantly creased. Applied EBM and allowed air drying. Moved infant to left breast infant again latches easily and nursed well for 10 mins, same activity noted on left breast, creasing present. Nipple shield applied, clicking sound improved, at end of latch colostrum pooled in shield and nipple appropriately round/everted. Initial breastfeeding packet given and reviewed. breastfeeding book provided. Maternal grandmother experienced breastfeeder as well and appears to support/understand breastfeeding education. Encouraged parents to watch Headroom videos 1-3. Encouraged mother to call for assistance with next feeding at 1800 before lactation leaves at 1900. Questions and concerns denied at this time.     Instructed mom our lactation team is here for continued support throughout their breastfeeding journey. Our team has encouraged mom to call with any questions or concerns that may arise after discharge.    Breastfeeding and Diaper Chart  Check List for Essentials of Positioning And Latch-on handout provided by Lactation Education Resources  Hand Expression handout provided by Lactation Education Resources  Five Keys to Successful Breastfeeding handout provided by Lactation Education Resources    The Many Benefits if Breastfeeding handout given  Breastfeeding saves time  *Breastfeeding allows you to calm or feed your baby immediately, which leads to a happier baby who cries less  *There is nothing to buy, prepare, or maintain.There is nothing to clean or sterilize.  Breastfeeding builds a mothers confidence  *She knows all her baby needs to thrive is her!  Breastfeeding saves Money  *There is no formula to buy and healthier breast fed babies have less medical costs  Healthy Mom/Healthy baby  * babies get sick less often, and when they do they are usually sick less severely and for a shorter time  * babies have fewer ear infections  * babies have fewer  allergies  *Mothers who breastfeed have a lower risk for cancer, osteoporosis, anemia, high blood pressure, obesity, and Type ll diabetes  *Mothers miss less work days with sick babies  Breast fed babies have a better dental health  * babies have better jaw development which requires lest orthodontic work  *Breast milk does not promote cavities  * babies can nurse at night without worry of tooth decay  Breastfeeding allows a baby to reach his full IQ potential  *The longer a baby is breast fed, the better their brain development  Breast fed babies and moms are more relaxed  *The hormones released during breastfeeding have a calming effect on mothers  *Breastfeeding requires mom to take a break; this may help mom get more rest after delivery  *Breastfeeding is quicker than preparing formula which allows mom and baby to get back to sleep faster  *Breastfeeding promotes bonding and allows mom to learn babies cues and care needs more quickly  Breastfeeding cleanup is easier  *The bowel movements and spit up of breast fed babies doesn't smell as bad  *Spit-up of breast fed babies doesn't stain clothing  Getting out of the hourse is easier  *No formula bottles to prepare and carry safely   *No time restraints due to worry about what baby will eat  *No worries about warming a bottle or finding safe water to prepare bottles  Breastfeeding mother get their bodies back sooner  *The uterus shrinks more quickly and completely, which allows a flatter tummy  *Breastfeeding burns 400-500 calories a day; making milk torches stored fat!  Breastfeeding is better for the environment  *There is no trash to dispose of after breastfeeding  *There is no production facility to produce breast milk; moms body does it all without the pollution of a factory    Educational Breastfeeding Videos on   YouTube  (length of video in minutes)    • Expressing the First Milk - Small Baby Series (7:19)  • Hand Expression [facility]  (7:34)  • Attaching Your Baby at the Breast - Breastfeeding Series (10:26)  • The Power of Pumping - [facility]   • Maximizing Production [facility] (9:35)  • Instructions for use Medela Symphony breastpump (English) (1:58)  • Medela 2-Phase Expression (4:05)  • Medela double pumping video (2:19)  • Choosing your PersonalFit breast shield size (3:04)  We also recommend visiting www.[employer] for valuable education and videos on breastfeeding full term AND  infants. This is a great resource to begin learning about breastfeeding during pregnancy as well.                [facility]             [phone]    Breastfeeding A Great Start Book by Azalia Guerrero RN, LCCE, ICD and MARICRUZ Vázquez MD, [facility] [place]    [facility] by [facility]    Freshly Expressed Breastmilk Storage Guidelines for Healthy Term Babies References: [email or web address]

## 2023-04-20 NOTE — OP NOTE
Alma Rosa Whitehead  : 1995  MRN: [id number]  CSN: [id number]     Section Operative Note    Pre-Operative Dx:   1. Intrauterine pregnancy at 37w2d weeks   2. IDDM, managed with insulin pump and Dexcom  3. Hypothyroidism  4. Maternal exhaustion/intolerance of labor      Postoperative dx:    1. Same as above     Procedure: Primary classical -section with inverted T incision       Surgeon: Rosie Gandara MD           Anesthesia: Epidural        EBL: 770 mls.   IV Fluids: 2000 mls.   UOP: 200 mls, clear.     Antibiotics: clindamycin 900 mgs     Infant:           Gender: female  infant    Weight: 3470 g (7 lb 10.4 oz)     Apgars: 8  @ 1 minute / 9  @ 5 minutes    Cord gases: Venous:No results found for: PHCVEN, DWF6GKAJ, FY3XJNY, ULN3TBRM, BECVEN, G2QWUHABY     Arterial:No results found for: PHCART, YWL6NNSX, SD3PQNH, BPQ9JPIE, BECART, V9AIAHPIT     Procedure Details:   The patient was taken to the OR where she was prepped and draped in the usual sterile fashion in the dorsal supine position with a leftward lateral tilt.  A Pfannenstiel incision was created sharply with the knife. That incision was carried through the underlying layers of fascia sharply.  The fascia was incised in the midline with the scalpel and this incision further developed using the fascial rip technique. The fascia was freed from its midline insertion superiorly and inferiorly. Rectus muscles were  in the midline. The peritoneum was entered bluntly, and the peritoneal window further developed using blunt stretching.  A medium Protractor was placed to aid with visualization.  A bladder flap was created sharply. The lower uterine segment was incised in a transverse fashion with the scalpel, and the uterine incision further developed with blunt stretching. Clear amniotic fluid was noted. The infant's right shoulder and arm immediately extended out through the incision, because the fetal head was so low in  the pelvis.  I tried to apply pressure to the baby shoulder to lift the baby up out of the pelvis so that I could get my hand behind the head, but this was unsuccessful.  Bandage scissors were used to convert her incision to an inverted T, which allowed a lot more room for maneuvering of the infant.  The head was delivered atraumatically. The mouth and nose were bulb suctioned.  The cord was clamped and cut after a 30 second delay.  The infant was then handed off to waiting NICU staff.  The placenta was allowed to deliver spontaneously. The uterus was cleared of clot and debris with moist laparotomy sponges. The uterine incision was closed with #0 vicryl in a continuous running locked fashion, closing the transverse incision independently from the vertical portion of the incision.  Each was closed in a 2 layer fashion.  3-0 Gut suture was then used to reapproximate the serosa.  Upon careful inspection, the uterine incision was noted to be hemostatic.  The bladder flap was reapproximated as serosa was being closed.    The adnexa were inspected, and noted to be normal bilaterally.    The paracolic gutters were cleared of clot and debris with moist laparotomy sponges. The uterine incision was inspected one final time and found to be hemostatic.  Lexii hemostatic matrix was applied over the hysterotomy incision to prevent adhesion formation during healing. The fascia was reapproximated  with #0 vicryl in a running fashion.  Ketty's fascia was loosely reapproximated with 3-0 vicryl and the skin was closed with 4-0 monocryl using a subcuticular stitch. Skin glue was applied bandage.  All counts were correct X2.         Complications:   None      Disposition:   Mother to Mother Baby/Postpartum  in stable condition currently.   Baby to remains with mom  in stable condition currently.       Rosie Gandara MD  4/20/2023  11:20 CDT

## 2023-04-20 NOTE — NURSING NOTE
Pt reported blood sugars today from Dexcom:     4/19  1926 - 124  2030 - 136 (fasting)  2130 - 139  2220 - 137    4/20  0130 - 85  0348 - 108  0516 - 111

## 2023-04-20 NOTE — ANESTHESIA PROCEDURE NOTES
Airway  Urgency: elective    Date/Time: 4/20/2023 10:24 AM  Airway not difficult    General Information and Staff    Patient location during procedure: OR  CRNA/CAA: Deloris Collins CRNA    Indications and Patient Condition  Indications for airway management: airway protection    Preoxygenated: yes  Mask difficulty assessment: 0 - not attempted    Final Airway Details  Final airway type: endotracheal airway      Successful airway: ETT  Cuffed: yes   Successful intubation technique: video laryngoscopy and RSI  Facilitating devices/methods: intubating stylet and cricoid pressure  Endotracheal tube insertion site: oral  Blade: Mcnally  Blade size: 3  ETT size (mm): 6.5  Cormack-Lehane Classification: grade I - full view of glottis  Placement verified by: capnometry   Cuff volume (mL): 5  Measured from: teeth  ETT/EBT  to teeth (cm): 23  Number of attempts at approach: 1  Assessment: lips, teeth, and gum same as pre-op and atraumatic intubation

## 2023-04-20 NOTE — PLAN OF CARE
Goal Outcome Evaluation:   Patient is a 28 year old ; IOL for Gestation HTN; She is a type 1 diabetic and has a dexcom and ominpod; Arrived on 23 and was induced with pitocin; patient reached dilation of 9-10 on 23 and decided due to pain that she wanted to have a . Patient delivered at 1024 to a baby girl that she is planning on breast feeding. Patient has been FF/U1/scant bleeding; pain 3/10 with fundal checks; VSS

## 2023-04-21 LAB
BASOPHILS # BLD AUTO: 0.02 10*3/MM3 (ref 0–0.2)
BASOPHILS NFR BLD AUTO: 0.1 % (ref 0–1.5)
DEPRECATED RDW RBC AUTO: 40.4 FL (ref 37–54)
EOSINOPHIL # BLD AUTO: 0.03 10*3/MM3 (ref 0–0.4)
EOSINOPHIL NFR BLD AUTO: 0.2 % (ref 0.3–6.2)
ERYTHROCYTE [DISTWIDTH] IN BLOOD BY AUTOMATED COUNT: 12.7 % (ref 12.3–15.4)
HCT VFR BLD AUTO: 29.4 % (ref 34–46.6)
HGB BLD-MCNC: 9.4 G/DL (ref 12–15.9)
IMM GRANULOCYTES # BLD AUTO: 0.08 10*3/MM3 (ref 0–0.05)
IMM GRANULOCYTES NFR BLD AUTO: 0.6 % (ref 0–0.5)
LYMPHOCYTES # BLD AUTO: 0.74 10*3/MM3 (ref 0.7–3.1)
LYMPHOCYTES NFR BLD AUTO: 5.5 % (ref 19.6–45.3)
MCH RBC QN AUTO: 28 PG (ref 26.6–33)
MCHC RBC AUTO-ENTMCNC: 32 G/DL (ref 31.5–35.7)
MCV RBC AUTO: 87.5 FL (ref 79–97)
MONOCYTES # BLD AUTO: 0.93 10*3/MM3 (ref 0.1–0.9)
MONOCYTES NFR BLD AUTO: 6.9 % (ref 5–12)
NEUTROPHILS NFR BLD AUTO: 11.65 10*3/MM3 (ref 1.7–7)
NEUTROPHILS NFR BLD AUTO: 86.7 % (ref 42.7–76)
NRBC BLD AUTO-RTO: 0 /100 WBC (ref 0–0.2)
PLATELET # BLD AUTO: 202 10*3/MM3 (ref 140–450)
PMV BLD AUTO: 12.1 FL (ref 6–12)
RBC # BLD AUTO: 3.36 10*6/MM3 (ref 3.77–5.28)
WBC NRBC COR # BLD: 13.45 10*3/MM3 (ref 3.4–10.8)

## 2023-04-21 PROCEDURE — 85025 COMPLETE CBC W/AUTO DIFF WBC: CPT | Performed by: OBSTETRICS & GYNECOLOGY

## 2023-04-21 PROCEDURE — 25010000002 KETOROLAC TROMETHAMINE PER 15 MG: Performed by: OBSTETRICS & GYNECOLOGY

## 2023-04-21 RX ADMIN — KETOROLAC TROMETHAMINE 15 MG: 15 INJECTION, SOLUTION INTRAMUSCULAR; INTRAVENOUS at 16:09

## 2023-04-21 RX ADMIN — IBUPROFEN 600 MG: 600 TABLET, FILM COATED ORAL at 22:38

## 2023-04-21 RX ADMIN — ACETAMINOPHEN 1000 MG: 500 TABLET, FILM COATED ORAL at 13:09

## 2023-04-21 RX ADMIN — ACETAMINOPHEN 650 MG: 325 TABLET ORAL at 18:50

## 2023-04-21 RX ADMIN — ACETAMINOPHEN 1000 MG: 500 TABLET, FILM COATED ORAL at 00:10

## 2023-04-21 RX ADMIN — KETOROLAC TROMETHAMINE 15 MG: 15 INJECTION, SOLUTION INTRAMUSCULAR; INTRAVENOUS at 03:46

## 2023-04-21 RX ADMIN — PRENATAL VIT W/ FE FUMARATE-FA TAB 27-0.8 MG 1 TABLET: 27-0.8 TAB at 08:44

## 2023-04-21 RX ADMIN — ACETAMINOPHEN 1000 MG: 500 TABLET, FILM COATED ORAL at 06:15

## 2023-04-21 RX ADMIN — Medication 3 ML: at 21:15

## 2023-04-21 RX ADMIN — KETOROLAC TROMETHAMINE 15 MG: 15 INJECTION, SOLUTION INTRAMUSCULAR; INTRAVENOUS at 09:40

## 2023-04-21 NOTE — ANESTHESIA POSTPROCEDURE EVALUATION
Patient: Nelia Whitehead    Procedure Summary     Date: 23 Room / Location: [facility] LABOR DELIVERY 2 /  PAD LABOR DELIVERY    Anesthesia Start: 1430 Anesthesia Stop: 23 1125    Procedure:  SECTION PRIMARY (Abdomen) Diagnosis:     Surgeons: Rosie Gandara MD Provider: Nicolas Harmon CRNA    Anesthesia Type: epidural ASA Status: 2          Anesthesia Type: epidural    Vitals  Vitals Value Taken Time   /69 23 1244   Temp 98.1 °F (36.7 °C) 23 1244   Pulse 84 23 1244   Resp 16 23 1244   SpO2 100 % 23 1244           Post Anesthesia Care and Evaluation    Patient location during evaluation: PHASE II  Patient participation: complete - patient participated  Level of consciousness: awake  Pain management: adequate    Airway patency: patent  Anesthetic complications: No anesthetic complications  Respiratory status: acceptable  Hydration status: acceptable

## 2023-04-21 NOTE — LACTATION NOTE
RN called requesting lactation to assist with feeding at 1822. Arrived in room at 1835, infant in crib, dressed. FOB changing stool diaper. Infant's BG at 1830  61. Instructed fob to undress infant after diaper change. Infant brought skin to skin with mother, began hand expressing drops, infant gapes and with shield in place, latches but begins to grimace, tongue thrusting and pushing away from breast. Infant then developed hiccups. 15 mins spent attempting latch once hiccups subsided. Infant remains disinterested, unable to stimulate suck to gloved finger. Hand expressed 3 ml, provided 1 ml to infant while infant remains skin to skin with mother. Infant remains calm, alert but without cues. Recommend keeping infant skin to skin until next feeding, next attempt no later than 2000. 2 ml of EBM left at bedside, instructed to give with next attempt as well. Questions denied. Encouraged patient to call upon nursing staff tonight as needed for assistance with latching.

## 2023-04-21 NOTE — PROGRESS NOTES
EDUIN Evans  [facility]  [address]  [place] [address]  Office [phone]  Fax [phone]      [facility]   PROGRESS NOTE    Post-Op Day 1 S/P   Subjective     Patient reports:  Pain is well controlled with scheduled Tylenol and Motrin. She is status post spinal anesthesia yesterday. She has roxicodone available as needed.  She is ambulating. Tolerating diet. Voiding - due to voided status post villarreal catheter removal this morning; flatus reported..  Vaginal bleeding is light.      Objective      Vitals: Vital Signs Range for the last 24 hours  Temperature: Temp:  [97.5 °F (36.4 °C)-99.6 °F (37.6 °C)] 98.2 °F (36.8 °C)   Temp Source: Temp src: Oral   BP: BP: (100-138)/(41-91) 107/47   Pulse: Heart Rate:  [] 100   Respirations: Resp:  [16-18] 16   SPO2: SpO2:  [94 %-100 %] 96 %   O2 Amount (l/min):     O2 Devices Device (Oxygen Therapy): room air   Weight: Weight:  [76.2 kg (168 lb)] 76.2 kg (168 lb)            Physical Exam    Lungs Respirations even and unlabored.   Abdomen Soft, non-tender, no organomegaly or masses.   Incision  no drainage, no erythema, no swelling, well approximated, skin glue dressing dry and intact   Extremities extremities normal, atraumatic, no cyanosis or edema     I reviewed the patient's new clinical results.  Lab Results (last 24 hours)     Procedure Component Value Units Date/Time    CBC & Differential [[id number]]  (Abnormal) Collected: 23    Specimen: Blood Updated: 23    Narrative:      The following orders were created for panel order CBC & Differential.  Procedure                               Abnormality         Status                     ---------                               -----------         ------                     CBC Auto Differential[[id number]]        Abnormal            Final result                 Please view results for these tests on the individual orders.    CBC Auto Differential  [[id number]]  (Abnormal) Collected: 04/21/23 0513    Specimen: Blood Updated: 04/21/23 0522     WBC 13.45 10*3/mm3      RBC 3.36 10*6/mm3      Hemoglobin 9.4 g/dL      Hematocrit 29.4 %      MCV 87.5 fL      MCH 28.0 pg      MCHC 32.0 g/dL      RDW 12.7 %      RDW-SD 40.4 fl      MPV 12.1 fL      Platelets 202 10*3/mm3      Neutrophil % 86.7 %      Lymphocyte % 5.5 %      Monocyte % 6.9 %      Eosinophil % 0.2 %      Basophil % 0.1 %      Immature Grans % 0.6 %      Neutrophils, Absolute 11.65 10*3/mm3      Lymphocytes, Absolute 0.74 10*3/mm3      Monocytes, Absolute 0.93 10*3/mm3      Eosinophils, Absolute 0.03 10*3/mm3      Basophils, Absolute 0.02 10*3/mm3      Immature Grans, Absolute 0.08 10*3/mm3      nRBC 0.0 /100 WBC           External Prenatal Results     Pregnancy Outside Results - Transcribed From Office Records - See Scanned Records For Details     Test Value Date Time    ABO  A  04/17/23 1850    Rh  Positive  04/17/23 1850    Antibody Screen  Negative  04/17/23 1850       Negative  09/28/22 0841    Varicella IgG       Rubella  1.69 index 09/28/22 0841    Hgb  9.4 g/dL 04/21/23 0513       11.4 g/dL 04/17/23 1850       11.2 g/dL 04/17/23 1149       11.9 g/dL 04/10/23 0946       12.0 g/dL 01/09/23 0751       12.9 g/dL 09/28/22 0841    Hct  29.4 % 04/21/23 0513       34.6 % 04/17/23 1850       34.7 % 04/17/23 1149       37.3 % 04/10/23 0946       33.8 % 01/09/23 0751       39.2 % 09/28/22 0841    Glucose Fasting GTT       Glucose Tolerance Test 1 hour       Glucose Tolerance Test 3 hour       Gonorrhea (discrete)  Not Detected  04/06/23 0946       Negative  09/28/22 0841    Chlamydia (discrete)  Not Detected  04/06/23 0946       Negative  09/28/22 0841    RPR  Non Reactive  09/28/22 0841    VDRL       Syphilis Antibody       HBsAg  Negative  09/28/22 0841    Herpes Simplex Virus PCR       Herpes Simplex VIrus Culture       HIV  Non Reactive  09/28/22 0841    Hep C RNA Quant PCR       Hep C Antibody  <0.1  s/co ratio 22 0841    AFP       Group B Strep  No Group B Streptococcus isolated  23 0946    GBS Susceptibility to Clindamycin       GBS Susceptibility to Erythromycin       Fetal Fibronectin       Genetic Testing, Maternal Blood             Drug Screening     Test Value Date Time    Urine Drug Screen       Amphetamine Screen       Barbiturate Screen       Benzodiazepine Screen       Methadone Screen       Phencyclidine Screen       Opiates Screen       THC Screen       Cocaine Screen       Propoxyphene Screen       Buprenorphine Screen       Methamphetamine Screen       Oxycodone Screen       Tricyclic Antidepressants Screen             Legend    ^: Historical                        Assessment & Plan        * No active hospital problems. *      Assessment:    Nelia Whitehead is Day 1  post-partum  , Low Transverse with T extension   .       Plan:  continue post op care.        This note has been signed electronically.    Nely Rosales DNP, APRN, CNM, RNC-OB  2023  08:23 CDT

## 2023-04-21 NOTE — LACTATION NOTE
Mother's Name: Nelia Phone #:[phone]  Infant Name:  Linh Weiner  :2023  Gestation:37w2d  Day of life:1  Birth weight:  7-10.4 (3470g) Discharge weight:  Weight Loss: -1.01%  24 hour Summary of Feeds: 4BF EBM 6.7 ml Formula 20 ml Voids: 2  Stools:3  Assistive devices (shields, shells, etc): nipple shield 20 mm  Significant Maternal history:, type 1 DM,insulin pump & dexcom monitor, hypothyroidism, Gastroparesis  Maternal Concerns: unsure if will be able to breastfeed  Maternal Goal: exclusive breastfeeding at least through maternity leave 16 weeks  Mother's Medications: humalog insulin, Synthroid, PNV  Breastpump for home: Evenflo double electric  Ped follow up appt:Braden Moran    F/u with parents to discuss breastfeeding progress. Mother states breastfeeding going ok but having some difficulty with latching. Using shield. Fob states infant seems disinterested in feedings. Discussed normal  behavior related to sleepiness or digestive discomfort. Infant brought back to room after CCHD, noted to exhibit excessive swallowing while laying in crib. Assisted to move infant upright and burping performed. Infant expelled burp and began stooling. Acknowledged infant likely had upset stomach with last feeding attempt, may feed better with next attempt. Advised parents to call lactation when ready for next feeding. Reiterated importance of continuing to offer EBM as much as possible in addition to breastfeeding attempts for blood glucose and weight management. Parents agreeable to call lactation with next feeding. Encouragement and support offered.     Instructed mom our lactation team is here for continued support throughout their breastfeeding journey. Our team has encouraged mom to call with any questions or concerns that may arise after discharge.      [id number]  Called back to room to observe feeding. Fob assisting with diaper change and moves infant to mothers chest. Fob assisting to latch infant to  left breast in cross cradle hold with shield in place. Infant latches with minimal attempts, begins sucking with deep jaw drops, frequent swallows noted during 10 mins at breast. Infant self releases. Demonstrated burping infant then moved to right breast. Assisted mother with hand position and latching in cross cradle hold. Infant again latches easily, nurses actively for additional 10 mins and again self releases and sleeping on mother's chest. Praise provided! Acknowledged signs of great feeding at this session. Recommend hand expression of at least 1 ml with breastfeeding attempts of lesser quality. Parents verbalize understanding.

## 2023-04-22 VITALS
HEART RATE: 75 BPM | SYSTOLIC BLOOD PRESSURE: 119 MMHG | BODY MASS INDEX: 25.46 KG/M2 | TEMPERATURE: 98.6 F | WEIGHT: 168 LBS | HEIGHT: 68 IN | OXYGEN SATURATION: 98 % | RESPIRATION RATE: 16 BRPM | DIASTOLIC BLOOD PRESSURE: 61 MMHG

## 2023-04-22 PROCEDURE — 63710000001 ONDANSETRON PER 8 MG: Performed by: OBSTETRICS & GYNECOLOGY

## 2023-04-22 PROCEDURE — 0503F POSTPARTUM CARE VISIT: CPT | Performed by: OBSTETRICS & GYNECOLOGY

## 2023-04-22 RX ORDER — FERROUS SULFATE 325(65) MG
325 TABLET ORAL
Status: DISCONTINUED | OUTPATIENT
Start: 2023-04-22 | End: 2023-04-22 | Stop reason: HOSPADM

## 2023-04-22 RX ORDER — IBUPROFEN 600 MG/1
600 TABLET ORAL EVERY 6 HOURS PRN
Qty: 40 TABLET | Refills: 1 | Status: SHIPPED | OUTPATIENT
Start: 2023-04-22

## 2023-04-22 RX ORDER — DOCUSATE SODIUM 100 MG/1
100 CAPSULE, LIQUID FILLED ORAL 2 TIMES DAILY
Qty: 60 CAPSULE | Refills: 1 | Status: SHIPPED | OUTPATIENT
Start: 2023-04-22 | End: 2023-05-02

## 2023-04-22 RX ORDER — OXYCODONE HYDROCHLORIDE AND ACETAMINOPHEN 5; 325 MG/1; MG/1
1 TABLET ORAL EVERY 8 HOURS PRN
Qty: 7 TABLET | Refills: 0 | Status: SHIPPED | OUTPATIENT
Start: 2023-04-22 | End: 2023-05-02

## 2023-04-22 RX ORDER — FERROUS SULFATE 325(65) MG
325 TABLET ORAL
Qty: 90 TABLET | Refills: 1 | Status: SHIPPED | OUTPATIENT
Start: 2023-04-22

## 2023-04-22 RX ADMIN — Medication 3 ML: at 08:11

## 2023-04-22 RX ADMIN — PRENATAL VIT W/ FE FUMARATE-FA TAB 27-0.8 MG 1 TABLET: 27-0.8 TAB at 08:11

## 2023-04-22 RX ADMIN — FERROUS SULFATE TAB 325 MG (65 MG ELEMENTAL FE) 325 MG: 325 (65 FE) TAB at 08:20

## 2023-04-22 RX ADMIN — OXYCODONE HYDROCHLORIDE 5 MG: 5 TABLET ORAL at 06:05

## 2023-04-22 RX ADMIN — ACETAMINOPHEN 650 MG: 325 TABLET ORAL at 07:34

## 2023-04-22 RX ADMIN — IBUPROFEN 600 MG: 600 TABLET, FILM COATED ORAL at 04:59

## 2023-04-22 RX ADMIN — ACETAMINOPHEN 650 MG: 325 TABLET ORAL at 01:59

## 2023-04-22 RX ADMIN — ONDANSETRON HYDROCHLORIDE 4 MG: 4 TABLET, FILM COATED ORAL at 06:05

## 2023-04-22 NOTE — PLAN OF CARE
Goal Outcome Evaluation:  Plan of Care Reviewed With: patient        Progress: improving  Outcome Evaluation: VSS, ff ml u1 scant lochia, ALT incision with dermabond CDI PACO, passing flatus, type 1 diabetic with insulin pump, breastfeeding with nipple shield, spouse and mother at bedside, bonding well with infant

## 2023-04-22 NOTE — PROGRESS NOTES
Rk Santacruz MD  [facility]  [address]  [place] [address]  Office [phone]  Fax [phone]      [facility]   PROGRESS NOTE    Post-Op Day 2 S/P   Subjective     Patient reports:  Pain is well controlled with ibuprofen (OTC) and narcotic analgesics including oxycodone.  She is ambulating. Tolerating diet. Voiding - without difficulty; flatus reported..  Vaginal bleeding is as much as expected.      Objective      Vitals: Vital Signs Range for the last 24 hours  Temperature: Temp:  [97.8 °F (36.6 °C)-98.9 °F (37.2 °C)] 98.9 °F (37.2 °C)   Temp Source: Temp src: Temporal   BP: BP: (107-126)/(56-71) 110/65   Pulse: Heart Rate:  [66-87] 75   Respirations: Resp:  [16-18] 16   SPO2: SpO2:  [97 %-100 %] 97 %   O2 Amount (l/min):     O2 Devices Device (Oxygen Therapy): room air   Weight:              Physical Exam    Lungs Non-labored, symmetrical chest rise   Abdomen Soft, no TTP, no distension   Incision  healing well, no drainage, no erythema, no hernia, no seroma, no swelling, well approximated   Extremities extremities normal, atraumatic, no cyanosis or edema     I reviewed the patient's new clinical results.  Lab Results (last 24 hours)     Procedure Component Value Units Date/Time    Tissue Pathology Exam [[id number]] Collected: 23 1055    Specimen: Tissue from Placenta Updated: 23 0900          Prenatal Labs  Lab Results   Component Value Date    HGB 9.4 (L) 2023    RUBELLAABIGG 1.69 2022    HEPBSAG Negative 2022    ABORH A Rh Positive 2015    ABSCRN Negative 2023    XRM1EUA3 Non Reactive 2022    HEPCVIRUSABY <0.1 2022    STREPGPB No Group B Streptococcus isolated 2023    URINECX Final report 2022    CHLAMNAA Not Detected 2023    NGONORRHON Not Detected 2023       Immunizations:   Immunization History   Administered Date(s) Administered   • DTP / HiB 1996   • FluLaval/Fluzone >6mos  10/09/2014, 10/07/2016, 2020, 10/14/2022   • Flublok 18+yrs 2021   • Flucelvax Quad Vial =>4yrs 10/14/2019   • Fluzone Quad >6mos (Multi-dose) 10/01/2012, 2015   • Hep B, Unspecified 04/15/1996   • Influenza, Unspecified 09/10/2009, 10/28/2010, 2013, 10/05/2018   • MMR 1996   • Pneumococcal Polysaccharide (PPSV23) 2009   • Tdap 2023     Lab Results (last 24 hours)     Procedure Component Value Units Date/Time    Tissue Pathology Exam [[id number]] Collected: 23 1055    Specimen: Tissue from Placenta Updated: 23 0900          CBC        4/10/2023    09:46 2023    11:49 2023    18:50 2023    05:13   CBC   WBC 9.42   8.95   9.60   13.45     RBC 4.25   4.04   4.05   3.36     Hemoglobin 11.9   11.2   11.4   9.4     Hematocrit 37.3   34.7   34.6   29.4     MCV 87.8   85.9   85.4   87.5     MCH 28.0   27.7   28.1   28.0     MCHC 31.9   32.3   32.9   32.0     RDW 12.2   12.4   12.4   12.7     Platelets 223   221   213   202           Assessment & Plan        * No active hospital problems. *        Assessment:    Nelia KIRAN Whitehead is Day 2  post-partum  , Low Transverse with T extension   .       Plan:  plan for discharge today.        Rk Santacruz MD  2023  07:21 CDT

## 2023-04-22 NOTE — LACTATION NOTE
Mother's Name: Nelia Phone #:[phone]  Infant Name:  Linh Weiner  :2023  Gestation:37w2d  Day of life:2  Birth weight:  7-10.4 (3470g) Discharge weight: 7-3.9 (3285g)  Weight Loss: -5.33%  24 hour Summary of Feeds: 8BF + 2.5 EBM Voids: 2  Stools:4  Assistive devices (shields, shells, etc): nipple shield 20 mm  Significant Maternal history:, type 1 DM,insulin pump & dexcom monitor, hypothyroidism, Gastroparesis  Maternal Concerns: unsure if will be able to breastfeed  Maternal Goal: exclusive breastfeeding at least through maternity leave 16 weeks  Mother's Medications: humalog insulin, Synthroid, PNV  Breastpump for home: Evenflo double electric  Ped follow up appt:Braden Moran    Discharge breastfeeding packet provided and reviewed with patient. Infant nursing and patient independently feeding. Reviewed expected feeding frequency and amounts, expected infant intake/output, pumping, and outpatient services. Questions denied.     Instructed mom our lactation team is here for continued support throughout their breastfeeding journey. Our team has encouraged mom to call with any questions or concerns that may arise after discharge.    Signs of Milk: Fullness, firmness, heaviness of breasts, leaking of milk.  Signs of Good Feed: Breast fullness prior to feed, breasts soft and comfortable after feeding. Infant content after feeding: calm, sleepy, relaxed and without continued hunger cues.  Signs of Plugged Ducts, Engorgement and Mastitis: Plugged ducts (milk entrapment in milk ducts)- small tender knots that often feel like little beans under breast tissue, usually tender. Massage on these areas of concern while breastfeeding or pumping to promote emptying.   Engorgement- fluid or excess milk, breasts become uncomfortably full, tight, firm (compare to the firmness of your cheek (mild), chin (moderate) or forehead (severe). First line of treatment should be to BREASTFEED, if breasts remain full feeling  after a feeding, it may be necessary to pump, ONLY UNTIL BREASTS ARE SOFT AND COMFORTABLE. DO NOT OVER PUMP (complete emptying of breasts can trigger even more milk which will cause continued, recurrent Engorgement).  Mastitis- Infection of the breast tissue, most often caused by plugged ducts that are not adequately treated by emptying, recurrent trauma to nipples or breasts (cracked or bleeding nipples). Signs: redness, swelling, tender knots or fever to breasts as well as generalized fever >101 degrees F that is often sudden onset. Treatment of mastitis, BREASTFEED! Pump after breastfeeding to achieve COMPLETE emptying of affected breast, utilizing massage to areas of concern, may use cold compress to affected area only after breast emptying. May take anti-inflammatories i.e. Ibuprofen, Motrin. CALL your OB for assessment and continued treatment with Antibiotics to adequately treat mastitis.  Infant Care: Over the first 2 weeks it is important to keep record of infant's feeding routine (feeding times and durations), wet and dirty diaper frequency, stool color and any spit ups that may occur.  Keep in mind, ALL babies will lose some weight initially (usually no more than 10% by day 3). Until infant returns to/ surpasses birth weight (which can take up to 2 weeks), it is important to offer feedings AT LEAST EVERY 3 HOURS. Remember, if you choose to supplement infant with formula or previously pumped milk, you should always pump in replacement of that feeding in order to promote and maintain a healthy milk supply!  Maternal Care: REST, sleep when the infant sleeps, stay hydrated (water is optimal) drink to thirst, increase caloric intake - breastfeeding mother's need an ADDITIONAL 500 calories per day , eat 3 meals/day as well as snacks in between, limit CAFFIENE intake to 2 cups/day. Ask your significant other, family members or friends for help when needed, taking advantage of meal trains, allowing others to help  with laundry, house chores, etc can help you focus on what is most important early on after delivery… you and your infant, and breastfeeding!   Medications to CONTINUE: Prenatal Vitamins are important to continue taking while breastfeeding. Fish oil, magnesium/calcium supplements often are helpful to support Mothers and their milk supply as well. Tylenol, Ibuprofen, regular Zyrtec, Claritin are SAFE if you suffer from seasonal allergies. Flonase is safe and often an effective medication to take if suffering from sinus drainage/pressure.  Medications to AVOID: Benadryl, Sudafed, any medications including “DM” or have a drying effect to sinus drainage will also dry a mother's milk up. Birth control- your OB will want to address birth control options with you usually around 4-6 weeks postpartum, be sure to notify your MD if you continue to breastfeed as some birth controls may significantly decrease your milk supply. Herbals- some herbs may also decrease your milk supply: PEPPERMINT, MENTHOL in any form (candies, essential oils, teas, etc), so check labels and avoid using in excess.  Pumping: Although we encourage you to focus on breastfeeding over the first 2-4 weeks, you will need to plan to begin pumping. We do recommend implementing pumping by the time infant is 4 weeks old. Pump 2-3 times per day immediately AFTER breastfeeding, it is normal to collect very small amounts initially, but the more consistently you pump, the more you will begin to collect. Store collected milk in refrigerator or freezer. You should also begin offering infant a bottle around 4 weeks. Remember to use slow flow nipples and PACE the bottle-feed. A bottle feed should take about as long as a breastfeeding session.

## 2023-04-22 NOTE — DISCHARGE SUMMARY
[facility]    Rk Santacruz MD  [address]  [place] [address]  [phone]      Discharge Summary      Nelia Whitehead  : 1995  MRN: [id number]  CSN: [id number]    Date of Admission: 2023   Date of Discharge:  2023   Delivering Physician: Rosie Gandara        Admission Diagnosis: 1. Pregnancy [Z34.90]  2. Induction of labor  3. 37 weeks gestation  4. Insulin depending diabetes mellitus     Discharge Diagnosis: 1. Pregnancy at 37w2d - delivered  2. S/p  section with T extension  3. Insulin dependent diabetes mellitus       Procedures: 2023  - , Low Transverse with T extension       Presenting Problem/History of Present Illness  Active Hospital Problems   No active problems to display.      Resolved Hospital Problems    Diagnosis Date Resolved POA   • **Pregnancy [Z34.90] 2023 Not Applicable        Hospital Course   Patient is a 28 y.o.  who at 37w2d had a  section. See the completed operative report for details regarding antepartum course and delivery. Her post-operative course was unremarkable.  On POD # 2 she felt like she ready for discharge.  She had no febrile morbidity. She had normal bowel and bladder function and was hemodynamically stable.  Her wound was healing well without obvious signs of infections.    Infant  female  fetus weighing 3470 g (7 lb 10.4 oz)   Apgars -  8 @ 1 minute /  9 @ 5 minutes.    Procedures Performed    Procedure(s):   SECTION PRIMARY  -------------------       Consults:   Consults     No orders found from 3/19/2023 to 2023.          Pertinent Test Results:   CBC        4/10/2023    09:46 2023    11:49 2023    18:50 2023    05:13   CBC   WBC 9.42   8.95   9.60   13.45     RBC 4.25   4.04   4.05   3.36     Hemoglobin 11.9   11.2   11.4   9.4     Hematocrit 37.3   34.7   34.6   29.4     MCV 87.8   85.9   85.4   87.5     MCH 28.0   27.7   28.1   28.0      MCHC 31.9   32.3   32.9   32.0     RDW 12.2   12.4   12.4   12.7     Platelets 223   221   213   202         Condition on Discharge:  Stable    Vital Signs  Temp:  [97.8 °F (36.6 °C)-98.9 °F (37.2 °C)] 98.9 °F (37.2 °C)  Heart Rate:  [66-87] 75  Resp:  [16-18] 16  BP: (107-126)/(56-71) 110/65    Physical Exam:   No exam performed today,    Discharge Disposition  Home or Self Care    Discharge Medications     Discharge Medications      New Medications      Instructions Start Date   ferrous sulfate 325 (65 FE) MG tablet   325 mg, Oral, Daily With Breakfast      ibuprofen 600 MG tablet  Commonly known as: ADVIL,MOTRIN   600 mg, Oral, Every 6 Hours PRN      oxyCODONE-acetaminophen 5-325 MG per tablet  Commonly known as: Percocet   1 tablet, Oral, Every 8 Hours PRN         Changes to Medications      Instructions Start Date   docusate sodium 100 MG capsule  Commonly known as: COLACE  What changed: Another medication with the same name was added. Make sure you understand how and when to take each.   100 mg, Oral, 2 Times Daily      docusate sodium 100 MG capsule  Commonly known as: Colace  What changed: You were already taking a medication with the same name, and this prescription was added. Make sure you understand how and when to take each.   100 mg, Oral, 2 Times Daily         Continue These Medications      Instructions Start Date   Accu-Chek Mona Plus w/Device kit   See Admin Instructions      aluminum hydroxide-mag carbonate 160-105 MG chewable tablet chewable tablet  Commonly known as: GAVISCON EXTRA RELIEF   Oral, 4 Times Daily With Meals & Nightly      calcium citrate-vitamin d 315-250 MG-UNIT tablet tablet  Commonly known as: CALCITRATE   Oral, Daily      Dexcom G6 Sensor   USE AS DIRECTED. CHANGE EVERY 10 DAYS.      Dexcom G6 Transmitter misc   USE AS DIRECTED -CHANGE EVERY 3 MONTHS-      glucagon 1 MG injection  Commonly known as: GLUCAGEN   No dose, route, or frequency recorded.      glucose blood test  strip   Does not apply      Insulin Lispro 100 UNIT/ML injection  Commonly known as: humaLOG   SQ use in insulin pump, up to 70 units a day      levothyroxine 75 MCG tablet  Commonly known as: Synthroid   75 mcg, Oral, Daily      Omnipod 5 G6 Intro (Gen 5) kit   Does not apply      prenatal (CLASSIC) vitamin  tablet  Generic drug: prenatal vitamin   Oral, Daily             Discharge Diet:  Home diet    Activity at Discharge: , pelvic rest, no lifting greater than baby's weight    Follow-up Appointments  No future appointments.    Follow-up for postpartum visit/ incision check in 2 weeks with Dr. Gandara.    Test Results Pending at Discharge  Pending Labs     Order Current Status    Tissue Pathology Exam In process           Rk Santacruz MD  23  07:50 CDT    Time: Discharge <30 min

## 2023-04-24 ENCOUNTER — HOSPITAL ENCOUNTER (OUTPATIENT)
Dept: LACTATION | Facility: HOSPITAL | Age: 28
Discharge: HOME | End: 2023-04-24
Payer: COMMERCIAL

## 2023-04-24 NOTE — LACTATION NOTE
Mother's Name: Nelia Whitehead  Contact Number: [phone]  G/P:   Breastfeeding Hx:  and fed EBM  Significant Medical History: DM I with insulin pump, hypothyroidism, gastroparesis  Maternal Breast Assessment: severely engorged    Infant's Name: Linh Weiner  Date of Birth: [birth year]  Gestational age at Birth: 37w2d  Age: 4 days  Physician: Dr. Bailey                     Reason for Visit:  follow up, frenectomy 23          Infant's Birth weight: 7-10.4 (3470g)  Previous Weight: 7-3.9 (3285g)  Wt Loss: -5.33%    Today's Weight:   7-3.5 (3274g)  Wt Loss: -5.6%    Feeding History Since Discharge/Last Lactation Appt.: breastfeeds 5 minutes every 4 hours sleeping between feedings, had 1 bottle yesterday of 30 ml. Mom pumped 5 ounces yesterday in one session and 6 ounces another session.     Past 24 Hours Voids/Stools:    3-4/4    Color of Stool: dark black/brown liquid    Pre Weight: 3274g           Left Breast:     15 mins             3308g  Returned to Left Breast:   5 mins  3308  Total Minutes:       20      Total Weight Gain:    34      gms    Average Feeding Amount for Age: 30-45 ml per feeding, 8-12 feedings per day    Interventions: Observed infant latch with nipple shield per patient request, milk leaking from breasts immediately, pumped right breast while nursing on left. Infant nursed    Education: positioning, latching with and without nipple shield, preventing engorgement, signs of a good feeding, signs infant is getting enough, LER handouts provided.     Notified MD/ Orders Received: na    Feeding Plan: Breastfeed on demand and at least every 3 hours, pump as needed to stay soft & comfortable    Plan of Care:     Interventions accomplished satisfactorily, requires no further action.    X Interventions require further assessment with [facility]     Interventions require further assessment with MD    Future Appointments:    Lactation: Monday, May 1st @ 10:00  am    Physician: Friday, April 28 @ 11am @ Dr. Bailey    Signature: Viridiana Trevino RN IBCLC

## 2023-05-02 ENCOUNTER — POSTPARTUM VISIT (OUTPATIENT)
Dept: OBSTETRICS AND GYNECOLOGY | Facility: CLINIC | Age: 28
End: 2023-05-02
Payer: COMMERCIAL

## 2023-05-02 VITALS
WEIGHT: 139 LBS | SYSTOLIC BLOOD PRESSURE: 120 MMHG | HEIGHT: 67 IN | BODY MASS INDEX: 21.82 KG/M2 | DIASTOLIC BLOOD PRESSURE: 70 MMHG

## 2023-05-02 DIAGNOSIS — O24.019 PRE-EXISTING TYPE 1 DIABETES MELLITUS DURING PREGNANCY, ANTEPARTUM: ICD-10-CM

## 2023-05-02 NOTE — PROGRESS NOTES
"Subjective   Chief Complaint   Patient presents with   • Postpartum Care     Pt here today for 2 week post op . Pt had baby girl Linh Weiner. Pt is currently breastfeeding. Pt voices no concerns.      Nelia Whitehead is a 28 y.o. year old  presenting to be seen for her postpartum visit.  She had a Primary  (LTCS) 2 weeks ago.  Patient not having any pain, but still feels a little tired.   Prenatal course was been complicated by type I DM with insulin pump, and hypothyroidism.  Patient feels like things are going well and reports to have a \"good baby\".  Lochia is intermittent now.    Since delivery she has not been sexually active.  She does not have concerns about post-partum blues/depression.  Patient feels like she is bonding well with baby.  She is breastfeeding and reports that it is going well..    The following portions of the patient's history were reviewed and updated as appropriate:current medications and allergies    Social History    Tobacco Use      Smoking status: Never      Smokeless tobacco: Never    Review of Systems      Objective   /70   Ht 170.2 cm (67\")   Wt 63 kg (139 lb)   LMP 2022   Breastfeeding Yes   BMI 21.77 kg/m²     General:  well developed; well nourished  no acute distress   Abdomen: soft, non-tender; no masses  incision is clean, dry, intact and without drainage   Pelvis: Not performed.          Assessment   1. Normal 2 week postpartum exam  2. Doing well  3. Patient reports glucose control has been better since delivery  4. No questions or concerns     Plan   1. BC options not reviewed and compared today  2. RTO in 4 weeks    No orders of the defined types were placed in this encounter.         This note was electronically signed.    Rosie Gandara MD  May 2, 2023    Answers for HPI/ROS submitted by the patient on 2023  Please describe your symptoms.: Check   Have you had these symptoms before?: No  How long have you been " having these symptoms?: 1-4 days  Please list any medications you are currently taking for this condition.: N/a  Please describe any probable cause for these symptoms. : N/a  What is the primary reason for your visit?: Other

## 2023-05-12 LAB
CYTO UR: NORMAL
LAB AP CASE REPORT: NORMAL
Lab: NORMAL
PATH REPORT.FINAL DX SPEC: NORMAL
PATH REPORT.GROSS SPEC: NORMAL

## 2023-05-31 ENCOUNTER — POSTPARTUM VISIT (OUTPATIENT)
Dept: OBSTETRICS AND GYNECOLOGY | Facility: CLINIC | Age: 28
End: 2023-05-31

## 2023-05-31 VITALS
SYSTOLIC BLOOD PRESSURE: 100 MMHG | DIASTOLIC BLOOD PRESSURE: 68 MMHG | WEIGHT: 140 LBS | BODY MASS INDEX: 21.97 KG/M2 | HEIGHT: 67 IN

## 2023-05-31 DIAGNOSIS — Z30.014 ENCOUNTER FOR INITIAL PRESCRIPTION OF INTRAUTERINE CONTRACEPTIVE DEVICE (IUD): Primary | ICD-10-CM

## 2023-05-31 DIAGNOSIS — Z78.9 NON-SMOKER: ICD-10-CM

## 2023-05-31 DIAGNOSIS — O24.019 PRE-EXISTING TYPE 1 DIABETES MELLITUS DURING PREGNANCY, ANTEPARTUM: ICD-10-CM

## 2023-05-31 PROCEDURE — 0503F POSTPARTUM CARE VISIT: CPT | Performed by: OBSTETRICS & GYNECOLOGY

## 2023-05-31 RX ORDER — LEVOTHYROXINE SODIUM 88 UG/1
88 TABLET ORAL DAILY
Qty: 30 TABLET | Refills: 11 | COMMUNITY
Start: 2023-05-30 | End: 2024-05-30

## 2023-05-31 NOTE — PROGRESS NOTES
"Subjective   Chief Complaint   Patient presents with    Postpartum Care     Pt here today for 6 week PP visit. Pt had baby girl Linh. Pt is currently breastfeeding. Pt voices no concerns.      Nelia Whitehead is a 28 y.o. year old  presenting to be seen for her postpartum visit.  She had a Primary  (LTCS) 6 weeks ago.  Patient not having any pain, unless baby kicks right on her scar.   Prenatal course was been complicated by type I DM with insulin pump, and hypothyroidism.   Patient feels like things are going well and reports to be bonding well with baby.  Lochia has resolved, and she is having a period now.  Patient reports diabetes management has been good since delivery, she just saw PCP yesterday.    Since delivery she has not been sexually active.  She does not have concerns about post-partum blues/depression, just tired.  She is  breastfeeding and reports that it is going well.    The following portions of the patient's history were reviewed and updated as appropriate:current medications and allergies    Social History    Tobacco Use      Smoking status: Never      Smokeless tobacco: Never    Review of Systems   Respiratory:  Negative for shortness of breath.    Cardiovascular:  Negative for chest pain.   Genitourinary:  Negative for pelvic pain and vaginal bleeding.       Objective   /68   Ht 170.2 cm (67\")   Wt 63.5 kg (140 lb)   Breastfeeding Yes   BMI 21.93 kg/m²     General:  well developed; well nourished  no acute distress   Abdomen: soft, non-tender; no masses  incision is healed   Pelvis: Not performed.          Assessment   Normal 6 week postpartum exam  Doing well  Patient reports glucose control has been better since delivery  Ready to be on contraception      Plan   BC options not reviewed.  Patient was on LoLoestrin in the past, but lost her insurance coverage for it - which is how she got pregnant.  Patient wants to have an IUD instead.  Mirena brochure reviewed " and given to patient.  Consent signed  RTO when available for placement.  Annual exam in 4 months    No orders of the defined types were placed in this encounter.         This note was electronically signed.    Rosie Gandara MD  May 31, 2023    Answers for HPI/ROS submitted by the patient on 2023  Please describe your symptoms.: Check   Have you had these symptoms before?: No  How long have you been having these symptoms?: 1-4 days  Please list any medications you are currently taking for this condition.: N/a  Please describe any probable cause for these symptoms. : N/a  What is the primary reason for your visit?: Other

## 2023-07-12 PROBLEM — Z97.5 IUD (INTRAUTERINE DEVICE) IN PLACE: Status: ACTIVE | Noted: 2023-07-05

## 2023-07-12 PROBLEM — E03.9 HYPOTHYROID IN PREGNANCY, ANTEPARTUM: Status: RESOLVED | Noted: 2022-10-03 | Resolved: 2023-07-12

## 2023-07-12 PROBLEM — O99.280 HYPOTHYROID IN PREGNANCY, ANTEPARTUM: Status: RESOLVED | Noted: 2022-10-03 | Resolved: 2023-07-12

## 2023-07-12 PROBLEM — O24.019 TYPE 1 DIABETES MELLITUS AFFECTING PREGNANCY, ANTEPARTUM: Status: RESOLVED | Noted: 2022-10-27 | Resolved: 2023-07-12

## 2023-07-12 PROBLEM — Z34.00 PRIMIGRAVIDA: Status: RESOLVED | Noted: 2022-09-28 | Resolved: 2023-07-12

## 2023-08-08 ENCOUNTER — OFFICE VISIT (OUTPATIENT)
Dept: OBSTETRICS AND GYNECOLOGY | Facility: CLINIC | Age: 28
End: 2023-08-08
Payer: COMMERCIAL

## 2023-08-08 VITALS
WEIGHT: 129 LBS | HEIGHT: 67 IN | BODY MASS INDEX: 20.25 KG/M2 | DIASTOLIC BLOOD PRESSURE: 64 MMHG | SYSTOLIC BLOOD PRESSURE: 110 MMHG

## 2023-08-08 DIAGNOSIS — Z30.431 CONTRACEPTIVE, SURVEILLANCE, INTRAUTERINE DEVICE: Primary | ICD-10-CM

## 2023-08-08 RX ORDER — PAROXETINE 10 MG/1
TABLET, FILM COATED ORAL
COMMUNITY
Start: 2023-08-05

## 2023-08-08 NOTE — PROGRESS NOTES
"Subjective     Nelia Whitehead is a 28 y.o. female    History of Present Illness  Patient arrived for surveillance of her intrauterine device. She reports that she has had days with bleeding almost like a period and then days where she has nearly nothing. She has not noticed bleeding or spotting today. She denies pain or other concerns. She relates her blood sugars are \"all over the place.\" She contributes part of this to the stress with going back to work. She is changing roles with her employer so she can work days, which may help to give her more of a routine. She is happy about this change.       /64   Ht 170.2 cm (67\")   Wt 58.5 kg (129 lb)   LMP 07/05/2023 (Exact Date)   Breastfeeding No   BMI 20.20 kg/mý     Outpatient Encounter Medications as of 8/8/2023   Medication Sig Dispense Refill    aluminum hydroxide-mag carbonate (GAVISCON EXTRA RELIEF) 160-105 MG chewable tablet chewable tablet Chew 4 (Four) Times a Day With Meals & at Bedtime.      Blood Glucose Monitoring Suppl (ACCU-CHEK ALEXANDRE PLUS) w/Device kit See Admin Instructions.  0    calcium citrate-vitamin d (CALCITRATE) 315-250 MG-UNIT tablet tablet Take  by mouth Daily.      Continuous Blood Gluc Sensor (Dexcom G6 Sensor) USE AS DIRECTED. CHANGE EVERY 10 DAYS.      Continuous Blood Gluc Transmit (Dexcom G6 Transmitter) misc USE AS DIRECTED -CHANGE EVERY 3 MONTHS-      Glucagon, rDNA, (Glucagon Emergency) 1 MG kit       glucose blood test strip       Insulin Disposable Pump (Omnipod 5 G6 Intro, Gen 5,) kit       Insulin Disposable Pump (Omnipod 5 G6 Pod, Gen 5,) misc USE AS DIRECTED EVERY OTHER DAY      Insulin Lispro (humaLOG) 100 UNIT/ML injection SQ use in insulin pump, up to 70 units a day      levothyroxine (SYNTHROID, LEVOTHROID) 88 MCG tablet Take 1 tablet by mouth Daily. 30 tablet 11    PARoxetine (PAXIL) 10 MG tablet       Levonorgestrel (MIRENA) 20 MCG/DAY intrauterine device IUD 1 each by Intrauterine route 1 (One) Time for 1 " dose. 1 each 0    [DISCONTINUED] prenatal vitamin (prenatal, CLASSIC, vitamin) tablet Take  by mouth Daily.       No facility-administered encounter medications on file as of 2023.       Surgical History  Past Surgical History:   Procedure Laterality Date     SECTION N/A 2023    Procedure:  SECTION PRIMARY;  Surgeon: Rosie Gandara MD;  Location: [facility] LABOR DELIVERY;  Service: Obstetrics/Gynecology;  Laterality: N/A;    CHOLECYSTECTOMY      COLONOSCOPY N/A 2017    Localized mild active inflammation was found in the rectum    ENDOSCOPY N/A 2017    Normal exam    WISDOM TOOTH EXTRACTION         Family History  Family History   Problem Relation Age of Onset    No Known Problems Mother     Diabetes Father         Type II    No Known Problems Brother     Raynaud syndrome Maternal Grandmother     Diabetes Maternal Grandfather         Type II    Coronary artery disease Maternal Grandfather     Diabetes Paternal Grandmother         Type II    Coronary artery disease Paternal Grandmother     Breast cancer Paternal Grandmother 75    Diabetes Paternal Grandfather         Type II    Colon cancer Paternal Grandfather 70    Colon polyps Neg Hx     Ovarian cancer Neg Hx     Uterine cancer Neg Hx     Melanoma Neg Hx     Prostate cancer Neg Hx        The following portions of the patient's history were reviewed and updated as appropriate: allergies, current medications, past family history, past medical history, past social history, past surgical history, and problem list.    Review of Systems   Constitutional:  Negative for chills, fatigue and fever.   Respiratory:  Negative for chest tightness and shortness of breath.    Cardiovascular:  Negative for chest pain and leg swelling.   Gastrointestinal:  Negative for abdominal pain.   Genitourinary:  Positive for menstrual problem (bleeding after IUD insertion). Negative for difficulty urinating, dysuria, flank pain, frequency, pelvic pain, pelvic  pressure, urgency, urinary incontinence, vaginal bleeding, vaginal discharge and vaginal pain.   Musculoskeletal:  Negative for gait problem.   Skin:  Negative for pallor and rash.   Allergic/Immunologic: Negative for environmental allergies, food allergies and immunocompromised state.   Neurological:  Negative for dizziness, light-headedness and headache.   Hematological:  Negative for adenopathy.   Psychiatric/Behavioral:  Negative for dysphoric mood, self-injury, suicidal ideas and depressed mood. The patient is not nervous/anxious.      Objective   Physical Exam  Vitals and nursing note reviewed.   Constitutional:       General: She is not in acute distress.     Appearance: Normal appearance. She is well-developed. She is not ill-appearing or diaphoretic.   HENT:      Head: Normocephalic and atraumatic.      Right Ear: External ear normal.      Left Ear: External ear normal.   Eyes:      General: Lids are normal. No scleral icterus.        Right eye: No discharge.         Left eye: No discharge.      Extraocular Movements: Extraocular movements intact.      Conjunctiva/sclera: Conjunctivae normal.   Neck:      Trachea: Phonation normal.   Cardiovascular:      Rate and Rhythm: Normal rate and regular rhythm.      Heart sounds: Normal heart sounds. No murmur heard.  Pulmonary:      Effort: Pulmonary effort is normal. No accessory muscle usage or respiratory distress.      Breath sounds: Normal breath sounds and air entry. No wheezing.   Chest:      Chest wall: No tenderness.   Abdominal:      General: There is no distension.      Palpations: Abdomen is soft.      Tenderness: There is no abdominal tenderness. There is no right CVA tenderness, left CVA tenderness, guarding or rebound.   Musculoskeletal:         General: No tenderness. Normal range of motion.      Cervical back: Normal range of motion and neck supple. No rigidity or tenderness. No pain with movement. Normal range of motion.      Right lower leg: No  edema.      Left lower leg: No edema.   Skin:     General: Skin is warm and dry.      Coloration: Skin is not ashen, cyanotic, jaundiced, mottled, pale or sallow.      Findings: No bruising, erythema, lesion or rash.   Neurological:      Mental Status: She is alert and oriented to person, place, and time.      Gait: Gait normal.   Psychiatric:         Attention and Perception: Attention and perception normal.         Mood and Affect: Mood normal.         Speech: Speech normal.         Behavior: Behavior normal. Behavior is cooperative.         Thought Content: Thought content normal.         Cognition and Memory: Cognition and memory normal.         Judgment: Judgment normal.       Ultrasound today: Retroverted uterus measures 6.27 cm in length.  Endometrial lining measures 2.6 mm.  IUD sitting in lower portion of uterus near cervix. Normal-appearing ovaries.    Chart reviewed for screenings  Last Pap Smear: 06/16/2022 NILM (ECTZ present)   Last Mammogram: Screening to begin at age 40 within guidelines. Family history of breast cancer in paternal grandmother diagnosed at age 75.    Last Colonoscopy: Screening to begin at age 45 within guidelines.  Family history of breast cancer in paternal grandmother diagnosed at age 70.  Last Bone Density Scan: Screening to begin at age 65 within guidelines.    Assessment & Plan   Diagnoses and all orders for this visit:    1. Contraceptive, surveillance, intrauterine device (Primary)  - Reviewed ultrasound.  - Discussed with patient intermenstrual/irregular bleeding during initial months after IUD placement, signs to report (including PAINS acronym), and measures of support (including NSAIDs and heat for bleeding and discomfort.  - Return to office annually for an annual well woman examination and surveillance of intrauterine contraception and as needed with any concerns.          BMI is within normal parameters. No other follow-up for BMI required.      This note has been  signed electronically.    Nely Rosales, JULIO C, APRN, CNM, RNC-OB  8/8/2023

## 2023-09-28 ENCOUNTER — OFFICE VISIT (OUTPATIENT)
Dept: OBSTETRICS AND GYNECOLOGY | Facility: CLINIC | Age: 28
End: 2023-09-28
Payer: COMMERCIAL

## 2023-09-28 VITALS
SYSTOLIC BLOOD PRESSURE: 104 MMHG | DIASTOLIC BLOOD PRESSURE: 68 MMHG | WEIGHT: 128 LBS | BODY MASS INDEX: 20.09 KG/M2 | HEIGHT: 67 IN

## 2023-09-28 DIAGNOSIS — F41.9 ANXIETY: ICD-10-CM

## 2023-09-28 DIAGNOSIS — Z01.419 ENCOUNTER FOR GYNECOLOGICAL EXAMINATION WITHOUT ABNORMAL FINDING: Primary | ICD-10-CM

## 2023-09-28 DIAGNOSIS — E03.9 HYPOTHYROIDISM (ACQUIRED): ICD-10-CM

## 2023-09-28 DIAGNOSIS — E10.9 TYPE 1 DIABETES MELLITUS WITHOUT COMPLICATION: ICD-10-CM

## 2023-09-28 DIAGNOSIS — Z12.4 CERVICAL CANCER SCREENING: ICD-10-CM

## 2023-09-28 DIAGNOSIS — Z78.9 NON-SMOKER: ICD-10-CM

## 2023-09-28 PROCEDURE — G0123 SCREEN CERV/VAG THIN LAYER: HCPCS | Performed by: OBSTETRICS & GYNECOLOGY

## 2023-09-28 RX ORDER — FLUOXETINE HYDROCHLORIDE 20 MG/1
CAPSULE ORAL
COMMUNITY
Start: 2023-09-12

## 2023-09-28 NOTE — PROGRESS NOTES
"Subjective      Nelia Whitehead is a 28 y.o. female who presents for her routine annual exam. Overall, patient reports to be \"well\".  Periods are regular every 28-30 days, lasting  \"I haven't been keeping track since IUD was placed\" . Dysmenorrhea:none. Cyclic symptoms include none. No intermenstrual bleeding, spotting, or discharge.    Current contraception: IUD.  Patient pleased so far  Regular self breast exam: no  History of abnormal Pap smear: no  History of abnormal mammogram:  N/A  Exercise: frequently    The following portions of the patient's history were reviewed and updated as appropriate: allergies, current medications, past family history, past medical history, past social history, past surgical history, and problem list.    heterosexual    Family History  Family History   Problem Relation Age of Onset    No Known Problems Mother     Diabetes Father         Type II    No Known Problems Brother     Raynaud syndrome Maternal Grandmother     Diabetes Maternal Grandfather         Type II    Coronary artery disease Maternal Grandfather     Diabetes Paternal Grandmother         Type II    Coronary artery disease Paternal Grandmother     Breast cancer Paternal Grandmother 75    Diabetes Paternal Grandfather         Type II    Colon cancer Paternal Grandfather 70    Colon polyps Neg Hx     Ovarian cancer Neg Hx     Uterine cancer Neg Hx     Melanoma Neg Hx     Prostate cancer Neg Hx        Review of Systems  Review of Systems   Constitutional:  Negative for activity change and unexpected weight loss.   HENT:  Negative for congestion.    Eyes:  Positive for visual disturbance (wears glasses).   Respiratory:  Negative for shortness of breath.    Cardiovascular:  Negative for chest pain.   Gastrointestinal:  Positive for blood in stool (sometimes, when constipated) and constipation. Negative for diarrhea.   Endocrine:        Temperature changes related to blood sugar highs/lows   Genitourinary:  Positive for " "decreased libido (low since baby was born). Negative for dyspareunia, pelvic pain, urinary incontinence, vaginal bleeding, vaginal discharge and vaginal pain.   Musculoskeletal:  Negative for arthralgias, back pain, neck pain and neck stiffness.   Skin:  Negative for rash.   Neurological:  Negative for dizziness and headache.   Psychiatric/Behavioral:  Negative for sleep disturbance and depressed mood. The patient is nervous/anxious (recently started Prozac. \"I don't like other people taking care of my baby\").            Objective        /68   Ht 170.2 cm (67\")   Wt 58.1 kg (128 lb)   BMI 20.05 kg/m²   Physical Exam  Vitals and nursing note reviewed. Exam conducted with a chaperone present.   Constitutional:       General: She is not in acute distress.     Appearance: She is well-developed.   HENT:      Head: Normocephalic and atraumatic.   Cardiovascular:      Rate and Rhythm: Normal rate and regular rhythm.      Heart sounds: No murmur heard.  Pulmonary:      Effort: Pulmonary effort is normal.      Breath sounds: Normal breath sounds.   Chest:   Breasts:     Right: No inverted nipple or mass.      Left: No inverted nipple or mass.   Abdominal:      General: There is no distension.      Palpations: Abdomen is soft.      Tenderness: There is abdominal tenderness (mild, generalized across lower abdomen).   Genitourinary:     General: Normal vulva.      Exam position: Lithotomy position.      Labia:         Right: No tenderness or lesion.         Left: No tenderness or lesion.       Vagina: Normal. No vaginal discharge, tenderness or bleeding.      Cervix: No cervical motion tenderness, discharge or friability.      Adnexa:         Right: No tenderness or fullness.          Left: No tenderness or fullness.     Musculoskeletal:         General: Normal range of motion.      Cervical back: Normal range of motion and neck supple.   Skin:     General: Skin is warm and dry.   Neurological:      Mental Status: She " is alert and oriented to person, place, and time.   Psychiatric:         Mood and Affect: Mood normal.         Behavior: Behavior normal.         Thought Content: Thought content normal.         Judgment: Judgment normal.             Assessment  & Plan    Diagnoses and all orders for this visit:    1. Encounter for gynecological examination without abnormal finding (Primary): Exam unremarkable, except for some very mild tenderness across the lower abdomen on exam.  IUD string visualized on speculum exam; Pap smear collected.  Labs with PCP, who is also managing the patient's type 1 diabetes and hypothyroidism.  Importance of regular self breast exam emphasized.    2. Non-smoker    3. Type 1 diabetes mellitus without complication    4. Hypothyroidism (acquired)    5. Anxiety: Patient reports this is mostly about other people caring for her baby.  Patient recently started on Prozac and is following back up with PCP office 1 week from now.    6. Cervical cancer screening  -     Liquid-based Pap Smear, Screening         This note was electronically signed.    Rosie Gandara MD  9/28/2023  10:10 CDT

## 2023-10-03 LAB
GEN CATEG CVX/VAG CYTO-IMP: NORMAL
LAB AP CASE REPORT: NORMAL
LAB AP GYN ADDITIONAL INFORMATION: NORMAL
Lab: NORMAL
PATH INTERP SPEC-IMP: NORMAL
STAT OF ADQ CVX/VAG CYTO-IMP: NORMAL

## 2025-06-24 ENCOUNTER — OFFICE VISIT (OUTPATIENT)
Dept: OBSTETRICS AND GYNECOLOGY | Age: 30
End: 2025-06-24
Payer: COMMERCIAL

## 2025-06-24 VITALS
HEIGHT: 67 IN | DIASTOLIC BLOOD PRESSURE: 78 MMHG | BODY MASS INDEX: 19.78 KG/M2 | SYSTOLIC BLOOD PRESSURE: 116 MMHG | WEIGHT: 126 LBS

## 2025-06-24 DIAGNOSIS — N92.1 BREAKTHROUGH BLEEDING WITH IUD: Primary | ICD-10-CM

## 2025-06-24 DIAGNOSIS — Q50.5 PARA-OVARIAN CYST: ICD-10-CM

## 2025-06-24 DIAGNOSIS — Z30.431 IUD CHECK UP: ICD-10-CM

## 2025-06-24 DIAGNOSIS — Z97.5 BREAKTHROUGH BLEEDING WITH IUD: Primary | ICD-10-CM

## 2025-06-24 LAB
B-HCG UR QL: NEGATIVE
EXPIRATION DATE: NORMAL
INTERNAL NEGATIVE CONTROL: NEGATIVE
INTERNAL POSITIVE CONTROL: POSITIVE
Lab: NORMAL

## 2025-06-24 PROCEDURE — 1159F MED LIST DOCD IN RCRD: CPT | Performed by: NURSE PRACTITIONER

## 2025-06-24 PROCEDURE — 81025 URINE PREGNANCY TEST: CPT | Performed by: NURSE PRACTITIONER

## 2025-06-24 PROCEDURE — 1160F RVW MEDS BY RX/DR IN RCRD: CPT | Performed by: NURSE PRACTITIONER

## 2025-06-24 PROCEDURE — 99213 OFFICE O/P EST LOW 20 MIN: CPT | Performed by: NURSE PRACTITIONER

## 2025-06-24 RX ORDER — ONDANSETRON 4 MG/1
TABLET, ORALLY DISINTEGRATING ORAL
COMMUNITY
Start: 2025-06-11

## 2025-06-24 RX ORDER — LEVOTHYROXINE SODIUM 50 UG/1
50 TABLET ORAL DAILY
COMMUNITY
Start: 2025-04-30 | End: 2026-05-01

## 2025-06-24 RX ORDER — MECLIZINE HYDROCHLORIDE 25 MG/1
1 TABLET ORAL 3 TIMES DAILY
COMMUNITY
Start: 2025-06-11

## 2025-06-24 NOTE — PROGRESS NOTES
Chief Complaint   Patient presents with    Vaginal Bleeding     Patient here for IUD check due to breakthrough bleeding with IUD. U/S in office.   Patient reports it is spotting.        History:  Nelia Whitehead is a 30 y.o. female who presents today for follow-up for evaluation of the above:  Pt comes in today with complaints of break through bleeding that she describes as spotting. At least has been going on for 6 months. Increased cramping with period as well.           ROS:  Review of Systems   Constitutional:  Negative for activity change and unexpected weight loss.   HENT:  Negative for congestion.    Cardiovascular:  Negative for chest pain.   Gastrointestinal:  Negative for blood in stool, constipation and diarrhea.   Endocrine: Negative for cold intolerance and heat intolerance.   Genitourinary:  Positive for menstrual problem. Negative for dyspareunia, pelvic pain and vaginal discharge.   Musculoskeletal:  Negative for arthralgias, back pain, neck pain and neck stiffness.   Skin:  Negative for rash.   Neurological:  Negative for dizziness and headache.   Psychiatric/Behavioral:  Negative for sleep disturbance. The patient is not nervous/anxious.        Ms. Whitehead  reports that she has never smoked. She has never used smokeless tobacco. She reports current alcohol use. She reports that she does not use drugs.      Current Outpatient Medications:     aluminum hydroxide-mag carbonate (GAVISCON EXTRA RELIEF) 160-105 MG chewable tablet chewable tablet, Chew 4 (Four) Times a Day With Meals & at Bedtime., Disp: , Rfl:     Blood Glucose Monitoring Suppl (ACCU-CHEK ALEXANDRE PLUS) w/Device kit, See Admin Instructions., Disp: , Rfl: 0    calcium citrate-vitamin d (CALCITRATE) 315-250 MG-UNIT tablet tablet, Take  by mouth Daily., Disp: , Rfl:     Continuous Blood Gluc Sensor (Dexcom G6 Sensor), USE AS DIRECTED. CHANGE EVERY 10 DAYS., Disp: , Rfl:     Continuous Blood Gluc Transmit (Dexcom G6 Transmitter) misc, USE  "AS DIRECTED -CHANGE EVERY 3 MONTHS-, Disp: , Rfl:     FLUoxetine (PROzac) 20 MG capsule, TAKE 1 CAPSULE BY MOUTH ONCE DAILY -TAPER OFF PAROXETINE-, Disp: , Rfl:     Glucagon, rDNA, (Glucagon Emergency) 1 MG kit, , Disp: , Rfl:     glucose blood test strip, , Disp: , Rfl:     Insulin Disposable Pump (Omnipod 5 G6 Intro, Gen 5,) kit, , Disp: , Rfl:     Insulin Disposable Pump (Omnipod 5 G6 Pod, Gen 5,) misc, USE AS DIRECTED EVERY OTHER DAY, Disp: , Rfl:     Insulin Lispro (humaLOG) 100 UNIT/ML injection, SQ use in insulin pump, up to 70 units a day, Disp: , Rfl:     levothyroxine (SYNTHROID, LEVOTHROID) 50 MCG tablet, Take 1 tablet by mouth Daily., Disp: , Rfl:     meclizine (ANTIVERT) 25 MG tablet, Take 1 tablet by mouth 3 times a day., Disp: , Rfl:     ondansetron ODT (ZOFRAN-ODT) 4 MG disintegrating tablet, DISSOLVE 1 TABLET IN MOUTH EVERY 6 TO 8 HOURS AS NEEDED FOR NAUSEA AND VOMITING, Disp: , Rfl:     Levonorgestrel (MIRENA) 20 MCG/DAY intrauterine device IUD, 1 each by Intrauterine route 1 (One) Time for 1 dose., Disp: 1 each, Rfl: 0      OBJECTIVE:  /78 (BP Location: Left arm, Patient Position: Sitting, Cuff Size: Adult)   Ht 170.2 cm (67\")   Wt 57.2 kg (126 lb)   LMP  (LMP Unknown)   BMI 19.73 kg/m²    Physical Exam  Vitals and nursing note reviewed.   Constitutional:       Appearance: She is well-developed.   HENT:      Head: Normocephalic and atraumatic.   Eyes:      General:         Right eye: No discharge.         Left eye: No discharge.      Conjunctiva/sclera: Conjunctivae normal.   Neck:      Thyroid: No thyromegaly.   Cardiovascular:      Rate and Rhythm: Normal rate and regular rhythm.      Heart sounds: Normal heart sounds. No murmur heard.  Pulmonary:      Effort: Pulmonary effort is normal.      Breath sounds: Normal breath sounds.   Musculoskeletal:      Cervical back: Normal range of motion and neck supple.   Skin:     General: Skin is warm and dry.   Neurological:      Mental Status: " She is alert and oriented to person, place, and time.   Psychiatric:         Mood and Affect: Mood normal.         Behavior: Behavior normal.         Thought Content: Thought content normal.         Judgment: Judgment normal.         Assessment/Plan    Diagnoses and all orders for this visit:    1. Breakthrough bleeding with IUD (Primary)  -     POC Pregnancy, Urine    2. IUD check up    3. Para-ovarian cyst    Ultrasound today shows IUD to be slightly low in uterus. Was noted after insertion in 2023 as well so may be stable. MD reading pending. Also right simple para ovarian cyst of 1.9 cm.     Discussed options with pt including removing iud and/or replacing iud. She states her periods and pain are still better now then prior to iud so she is hesitant. She wishes to continue same at this time. Will repeat ultrasound in 6 weeks.            An After Visit Summary was printed and given to the patient at discharge.  Return in about 6 weeks (around 8/5/2025) for Recheck ultrasound and OV. Sooner if problems arise.          EDUIN Robertson  Electronically Signed

## 2025-08-06 ENCOUNTER — OFFICE VISIT (OUTPATIENT)
Dept: OBSTETRICS AND GYNECOLOGY | Age: 30
End: 2025-08-06
Payer: COMMERCIAL

## 2025-08-06 VITALS
WEIGHT: 142 LBS | BODY MASS INDEX: 22.29 KG/M2 | HEIGHT: 67 IN | SYSTOLIC BLOOD PRESSURE: 98 MMHG | DIASTOLIC BLOOD PRESSURE: 62 MMHG

## 2025-08-06 DIAGNOSIS — Z30.431 IUD CHECK UP: ICD-10-CM

## 2025-08-06 DIAGNOSIS — Z01.419 WELL WOMAN EXAM WITH ROUTINE GYNECOLOGICAL EXAM: Primary | ICD-10-CM

## 2025-08-06 DIAGNOSIS — Q50.5 PARA-OVARIAN CYST: ICD-10-CM

## (undated) DEVICE — SUT MNCRYL 4/0 PS2 27IN UD MCP426H

## (undated) DEVICE — SENSR O2 OXIMAX FNGR A/ 18IN NONSTR

## (undated) DEVICE — Device: Brand: DEFENDO AIR/WATER/SUCTION AND BIOPSY VALVE

## (undated) DEVICE — APPL CHLORAPREP HI/LITE 26ML ORNG

## (undated) DEVICE — SAFEPICO ASPIRATOR ARTERIAL BLOOD SAMPLER. ASPIRATOR ONLY WITH VENTED TIPCAP, 1.5 ML, BOX OF 100 PCS.: Brand: SAFEPICO ASPIRATOR

## (undated) DEVICE — FRCP BIOP COLD ENDOJAW ALLGTR W/NDL 2.8X2300MM BLU

## (undated) DEVICE — CUFF,BP,DISP,1 TUBE,ADULT,HP: Brand: MEDLINE

## (undated) DEVICE — Device

## (undated) DEVICE — TBG PENCL TELESCP MEGADYNE SMOKE EVAC 10FT

## (undated) DEVICE — ANTIBACTERIAL VIOLET BRAIDED (POLYGLACTIN 910), SYNTHETIC ABSORBABLE SUTURE: Brand: COATED VICRYL

## (undated) DEVICE — PATIENT RETURN ELECTRODE, SINGLE-USE, CONTACT QUALITY MONITORING, ADULT, WITH 15 FT (4.5 M) CORD. FOR PATIENTS WEIGHING OVER 33LBS. (15KG): Brand: MEGADYNE

## (undated) DEVICE — ENDOGATOR AUXILIARY WATER JET CONNECTOR: Brand: ENDOGATOR

## (undated) DEVICE — ADHS SKIN PREMIERPRO EXOFIN TOPICAL HI/VISC .5ML

## (undated) DEVICE — PAD C-SECTION: Brand: MEDLINE INDUSTRIES, INC.

## (undated) DEVICE — CONMED SCOPE SAVER BITE BLOCK, 20X27 MM: Brand: SCOPE SAVER

## (undated) DEVICE — KENDALL SCD EXPRESS SLEEVES, KNEE LENGTH, LARGE: Brand: KENDALL SCD

## (undated) DEVICE — GLV SURG SENSICARE SLT PF LF 6 STRL

## (undated) DEVICE — MSK O2 MD CONCENTR A/ LF 7FT 1P/U

## (undated) DEVICE — CVR HNDL LIGHT RIGID

## (undated) DEVICE — LARGE, DISPOSABLE C-SECTION RETRACTOR: Brand: ALEXIS ® O C-SECTION PROTECTOR/RETRACTOR

## (undated) DEVICE — THE CHANNEL CLEANING BRUSH IS A NYLON FLEXI BRUSH ATTACHED TO A FLEXIBLE PLASTIC SHEATH DESIGNED TO SAFELY REMOVE DEBRIS FROM FLEXIBLE ENDOSCOPES.

## (undated) DEVICE — SUT VIC 0 CT1 36IN J946H

## (undated) DEVICE — TBG SMPL FLTR LINE NASL 02/C02 A/ BX/100